# Patient Record
Sex: FEMALE | Race: WHITE | NOT HISPANIC OR LATINO | Employment: FULL TIME | URBAN - METROPOLITAN AREA
[De-identification: names, ages, dates, MRNs, and addresses within clinical notes are randomized per-mention and may not be internally consistent; named-entity substitution may affect disease eponyms.]

---

## 2017-03-27 ENCOUNTER — TELEPHONE (OUTPATIENT)
Dept: FAMILY MEDICINE | Facility: CLINIC | Age: 52
End: 2017-03-27

## 2017-03-27 NOTE — TELEPHONE ENCOUNTER
Pt called because [employer] told her that she needs permission from us to us [facility]. [facility] stated that they have reached out to us twice and have not been able to get hold of anyone and have asked the Pt to call us to see if she could get some where. Told Pt that [facility] does not need our permission that where she goes for pharmacy is determined between her and her insurance.   Pt is going to contact [employer] again.     Sarah.RADHA Larsen ([facility])

## 2017-11-30 ENCOUNTER — OFFICE VISIT (OUTPATIENT)
Dept: FAMILY MEDICINE | Facility: CLINIC | Age: 52
End: 2017-11-30

## 2017-11-30 VITALS
OXYGEN SATURATION: 98 % | HEIGHT: 66 IN | WEIGHT: 153 LBS | BODY MASS INDEX: 24.59 KG/M2 | TEMPERATURE: 98.1 F | SYSTOLIC BLOOD PRESSURE: 114 MMHG | HEART RATE: 82 BPM | DIASTOLIC BLOOD PRESSURE: 76 MMHG

## 2017-11-30 DIAGNOSIS — Z30.8 ENCOUNTER FOR OTHER CONTRACEPTIVE MANAGEMENT: ICD-10-CM

## 2017-11-30 DIAGNOSIS — B00.9 HERPES SIMPLEX DISEASE: ICD-10-CM

## 2017-11-30 DIAGNOSIS — Z23 NEED FOR VACCINATION: ICD-10-CM

## 2017-11-30 DIAGNOSIS — Z00.00 ROUTINE HISTORY AND PHYSICAL EXAMINATION OF ADULT: Primary | ICD-10-CM

## 2017-11-30 LAB
ERYTHROCYTE [DISTWIDTH] IN BLOOD BY AUTOMATED COUNT: 11.3 %
HCT VFR BLD AUTO: 39.4 % (ref 35–47)
HEMOGLOBIN: 13.1 G/DL (ref 11.7–15.7)
MCH RBC QN AUTO: 30.7 PG (ref 26–33)
MCHC RBC AUTO-ENTMCNC: 33.2 G/DL (ref 31–36)
MCV RBC AUTO: 92.2 FL (ref 78–100)
PLATELET COUNT - QUEST: 381 10^9/L (ref 150–375)
RBC # BLD AUTO: 4.27 10*12/L (ref 3.8–5.2)
WBC # BLD AUTO: 8.7 10*9/L (ref 4–11)

## 2017-11-30 PROCEDURE — 90686 IIV4 VACC NO PRSV 0.5 ML IM: CPT | Performed by: FAMILY MEDICINE

## 2017-11-30 PROCEDURE — 90471 IMMUNIZATION ADMIN: CPT | Performed by: FAMILY MEDICINE

## 2017-11-30 PROCEDURE — 85027 COMPLETE CBC AUTOMATED: CPT | Performed by: FAMILY MEDICINE

## 2017-11-30 PROCEDURE — 80048 BASIC METABOLIC PNL TOTAL CA: CPT | Mod: 90 | Performed by: FAMILY MEDICINE

## 2017-11-30 PROCEDURE — 36415 COLL VENOUS BLD VENIPUNCTURE: CPT | Performed by: FAMILY MEDICINE

## 2017-11-30 PROCEDURE — 99396 PREV VISIT EST AGE 40-64: CPT | Mod: 25 | Performed by: FAMILY MEDICINE

## 2017-11-30 PROCEDURE — 80061 LIPID PANEL: CPT | Mod: 90 | Performed by: FAMILY MEDICINE

## 2017-11-30 RX ORDER — ACYCLOVIR 800 MG/1
TABLET ORAL
Qty: 18 TABLET | Refills: 1 | Status: SHIPPED | OUTPATIENT
Start: 2017-11-30 | End: 2018-12-20

## 2017-11-30 RX ORDER — LEVONORGESTREL/ETHIN.ESTRADIOL 0.1-0.02MG
1 TABLET ORAL DAILY
Qty: 84 TABLET | Refills: 1 | Status: SHIPPED | OUTPATIENT
Start: 2017-11-30 | End: 2018-12-20

## 2017-11-30 NOTE — MR AVS SNAPSHOT
"              After Visit Summary   11/30/2017    Thao Boyce    MRN: [id number]           Patient Information     Date Of Birth          1965        Visit Information        Provider Department      11/30/2017 8:00 AM Sara Duran MD [facility]        Today's Diagnoses     Routine history and physical examination of adult    -  1    Need for vaccination        Herpes simplex disease        Encounter for other contraceptive management          Care Instructions    Placebo week (after your third day of the off week)- schedule a lab only appointment          Follow-ups after your visit        Future tests that were ordered for you today     Open Standing Orders        Priority Remaining Interval Expires Ordered    Follicle stimulating hormone ([facility]) Routine 1/1 2/28/2018 11/30/2017            Who to contact     If you have questions or need follow up information about today's clinic visit or your schedule please contact [facility] directly at [phone].  Normal or non-critical lab and imaging results will be communicated to you by MyChart, letter or phone within 4 business days after the clinic has received the results. If you do not hear from us within 7 days, please contact the clinic through [employer] or phone. If you have a critical or abnormal lab result, we will notify you by phone as soon as possible.  Submit refill requests through [employer] or call your pharmacy and they will forward the refill request to us. Please allow 3 business days for your refill to be completed.          Additional Information About Your Visit        MyChart Information     [employer] lets you send messages to your doctor, view your test results, renew your prescriptions, schedule appointments and more. To sign up, go to [email or web address] . Click on \"Log in\" on the left side of the screen, which will take you to the Welcome page. Then click on \"Sign up Now\" on the " "right side of the page.     You will be asked to enter the access code listed below, as well as some personal information. Please follow the directions to create your username and password.     Your access code is: [email or web address]  Expires: 2018  8:38 AM     Your access code will  in 90 days. If you need help or a new code, please call your [facility] clinic or [phone].        Care EveryWhere ID     This is your Care EveryWhere ID. This could be used by other organizations to access your [facility] medical records  [id number]        Your Vitals Were     Pulse Temperature Height Last Period Pulse Oximetry Breastfeeding?    82 98.1  F (36.7  C) (Oral) 1.676 m (5' 6\") 2017 98% No    BMI (Body Mass Index)                   24.69 kg/m2            Blood Pressure from Last 3 Encounters:   17 114/76   16 124/76   10/04/16 120/80    Weight from Last 3 Encounters:   17 69.4 kg (153 lb)   16 72 kg (158 lb 12.8 oz)   10/04/16 69.8 kg (153 lb 12.8 oz)              We Performed the Following     BASIC METABOLIC PANEL ([facility])     HC FLU VAC PRESRV FREE QUAD SPLIT VIR 3+YRS IM     HEMOGRAM/PLATELET (BFP)     Lipid Profile     VACCINE ADMINISTRATION, INITIAL     VENOUS COLLECTION          Where to get your medicines      These medications were sent to [facility]/pharmacy [address] [place], [place] - [address] [facility]  [address] [place] [id number]     Phone:  [phone]     acyclovir 800 MG tablet    levonorgestrel-ethinyl estradiol 0.1-20 MG-MCG per tablet          Primary Care Provider Office Phone # Fax #    Sara Duran [phone] [address] [address]        Equal Access to Services     [facility] : Tarik Sinclair, rylan browne, blanka irby . [facility] [phone].    ATENCIÓN: Si habla español, tiene a nix disposición servicios gratuitos de " asistencia lingüística. Alissa al [phone].    We comply with applicable federal civil rights laws and [place] laws. We do not discriminate on the basis of race, color, national origin, age, disability, sex, sexual orientation, or gender identity.            Thank you!     Thank you for choosing [facility]  for your care. Our goal is always to provide you with excellent care. Hearing back from our patients is one way we can continue to improve our services. Please take a few minutes to complete the written survey that you may receive in the mail after your visit with us. Thank you!             Your Updated Medication List - Protect others around you: Learn how to safely use, store and throw away your medicines at www.disposemymeds.org.          This list is accurate as of: 11/30/17  8:38 AM.  Always use your most recent med list.                   Brand Name Dispense Instructions for use Diagnosis    acyclovir 800 MG tablet    ZOVIRAX    18 tablet    TAKE 1 TABLET BY MOUTH 3 TIMES A DAY FOR 2 DAYS FOR ACUTE SYMPTOMS OF HERPES    Herpes simplex disease       levonorgestrel-ethinyl estradiol 0.1-20 MG-MCG per tablet    AVIANE,ALESSE,LESSINA    84 tablet    Take 1 tablet by mouth daily    Encounter for other contraceptive management       ONE-A-DAY WOMENS FORMULA Tabs      Take 1 tablet by mouth as needed.        selenium sulfide 2.5 % lotion    SELSUN    118 mL    Apply the 2.5% lotion to affected area and lather with small amounts of water; leave on skin for 10 minutes, then rinse thoroughly; apply every day for 7 days;    Dermatitis

## 2017-11-30 NOTE — NURSING NOTE
Thao is here for CPX    Patient is here for a full physical exam.    Pre-Visit Screening :  Immunizations : up to date flu today  Colon Screening : is up to date  Mammogram: UTD  Asthma Action Test/Plan : NA  PHQ9/GAD7 :  None  Fall Risk Assessment NA    Vitals:  Pulse - regular  My Chart - declines    CLASSIFICATION OF OVERWEIGHT AND OBESITY BY BMI                         Obesity Class           BMI(kg/m2)  Underweight                                    < 18.5  Normal                                         18.5-24.9  Overweight                                     25.0-29.9  OBESITY                     I                  30.0-34.9                              II                 35.0-39.9  EXTREME OBESITY             III                >40                             Patient's  BMI Body mass index is 24.69 kg/(m^2).  http://hin.nhlbi.nih.gov/menuplanner/menu.cgi  Questioned patient about current smoking habits.  Pt. has never smoked.    ETOH screening:  Questions:  1-How often do you have a drink containing alcohol?                             2 times per week(s)  2-How many drinks containing alcohol do you have on a typical day when you are         Drinking?                              2   3- How often do you have 5 or more drinks on one occasion?                              Never    Have you ever:  None of the patient's responses to the CAGE screening were positive / Negative CAGE score

## 2017-12-01 LAB
BUN SERPL-MCNC: 14 MG/DL (ref 7–25)
BUN/CREATININE RATIO: NORMAL (CALC) (ref 6–22)
CALCIUM SERPL-MCNC: 9.3 MG/DL (ref 8.6–10.4)
CHLORIDE SERPLBLD-SCNC: 104 MMOL/L (ref 98–110)
CHOLEST SERPL-MCNC: 182 MG/DL
CHOLEST/HDLC SERPL: 3.5 (CALC)
CO2 SERPL-SCNC: 24 MMOL/L (ref 20–31)
CREAT SERPL-MCNC: 0.68 MG/DL (ref 0.5–1.05)
EGFR AFRICAN AMERICAN - QUEST: 117 ML/MIN/1.73M2
GFR SERPL CREATININE-BSD FRML MDRD: 101 ML/MIN/1.73M2
GLUCOSE - QUEST: 86 MG/DL (ref 65–99)
HDLC SERPL-MCNC: 52 MG/DL
LDLC SERPL CALC-MCNC: 105 MG/DL (CALC)
NONHDLC SERPL-MCNC: 130 MG/DL (CALC)
POTASSIUM SERPL-SCNC: 4.1 MMOL/L (ref 3.5–5.3)
SODIUM SERPL-SCNC: 139 MMOL/L (ref 135–146)
TRIGL SERPL-MCNC: 133 MG/DL

## 2018-01-12 ENCOUNTER — TRANSFERRED RECORDS (OUTPATIENT)
Dept: FAMILY MEDICINE | Facility: CLINIC | Age: 53
End: 2018-01-12

## 2018-01-12 DIAGNOSIS — Z00.00 ROUTINE HISTORY AND PHYSICAL EXAMINATION OF ADULT: ICD-10-CM

## 2018-01-12 PROCEDURE — 83001 ASSAY OF GONADOTROPIN (FSH): CPT | Mod: 90 | Performed by: FAMILY MEDICINE

## 2018-01-12 PROCEDURE — 36415 COLL VENOUS BLD VENIPUNCTURE: CPT | Performed by: FAMILY MEDICINE

## 2018-01-13 LAB — FSH SERPL-ACNC: 23.4 MIU/ML

## 2018-12-20 ENCOUNTER — OFFICE VISIT (OUTPATIENT)
Dept: FAMILY MEDICINE | Facility: CLINIC | Age: 53
End: 2018-12-20

## 2018-12-20 VITALS
SYSTOLIC BLOOD PRESSURE: 138 MMHG | WEIGHT: 144 LBS | TEMPERATURE: 97.9 F | HEIGHT: 66 IN | HEART RATE: 69 BPM | BODY MASS INDEX: 23.14 KG/M2 | OXYGEN SATURATION: 98 % | DIASTOLIC BLOOD PRESSURE: 90 MMHG

## 2018-12-20 DIAGNOSIS — B00.9 HERPES SIMPLEX DISEASE: ICD-10-CM

## 2018-12-20 DIAGNOSIS — Z00.00 ENCOUNTER FOR ROUTINE ADULT HEALTH EXAMINATION WITHOUT ABNORMAL FINDINGS: Primary | ICD-10-CM

## 2018-12-20 LAB — GLUCOSE SERPL-MCNC: 92 MG/DL (ref 60–99)

## 2018-12-20 PROCEDURE — 36415 COLL VENOUS BLD VENIPUNCTURE: CPT | Performed by: FAMILY MEDICINE

## 2018-12-20 PROCEDURE — 87389 HIV-1 AG W/HIV-1&-2 AB AG IA: CPT | Mod: 90 | Performed by: FAMILY MEDICINE

## 2018-12-20 PROCEDURE — 82947 ASSAY GLUCOSE BLOOD QUANT: CPT | Performed by: FAMILY MEDICINE

## 2018-12-20 PROCEDURE — 99396 PREV VISIT EST AGE 40-64: CPT | Performed by: FAMILY MEDICINE

## 2018-12-20 RX ORDER — ACYCLOVIR 800 MG/1
TABLET ORAL
Qty: 18 TABLET | Refills: 1 | Status: SHIPPED | OUTPATIENT
Start: 2018-12-20 | End: 2020-01-06

## 2018-12-20 ASSESSMENT — MIFFLIN-ST. JEOR: SCORE: 1279.93

## 2018-12-20 NOTE — PROGRESS NOTES
Chief Complaint: Thao Boyce is an 53 year old woman who presents for preventive health visit.      Besides routine health maintenance, she has no other health concerns today .     Perimenopausal  Period : two this year- May and july  Menopause symptoms:  Might feel wamer  No vaginal dryness  Long term sleep issues  Gabapentin- good relief of hot flashes (not on current list of medications    Health Care Maintenance:discussed  shingrix  Consider Hepatitis A    Cold the past two weeks    Recently learned that her biological father was of [place] ancestery    Healthy Habits:  Do you get at least three servings of calcium containing foods daily (dairy, green leafy vegetables, etc.)?  Lacks fruit and vetatables.  Vitamin: one a day multiple  Outside of work or daily activities, how many days per week do you exercise for 30 minutes or longer? Biking three times a week  Have you had an eye exam in the past two years? yes  Do you see a dentist twice per year? yes    PHQ-2  Over the last two weeks- Have you been bothered by little interest or pleasure in doing things?  No  Over the last two weeks- Have you been feeling down, depressed, or hopeless?  No    Abuse: Current or Past (Physical, Sexual or Emotional)- no  Do you feel safe in your environment - yes    Advanced directive:completed and scanned in [facility]    Social History     Tobacco Use     Smoking status: Never Smoker     Smokeless tobacco: Never Used   Substance Use Topics     Alcohol use: Yes     Alcohol/week: 1.0 oz     Types: 2 drink(s) per week     Comment: 2 oz per week     The patient does not drink >3 drinks per day nor >7 drinks per week.    Reviewed orders with patient.  Reviewed health maintenance and updated orders accordingly - Yes      History of abnormal Pap smear: NO - age 30- 65 PAP every 3 years recommended  NO - age 30-65 PAP every 5 years with negative HPV co-testing recommended  All Histories reviewed and updated in Epic.      ROS:   ROS: 10 point  "ROS neg other than the symptoms noted above in the HPI.  Intentional weight loss of ten+ pounds this year      OBJECTIVE:  /90 (BP Location: Left arm, Patient Position: Sitting, Cuff Size: Adult Regular)   Pulse 69   Temp 97.9  F (36.6  C) (Oral)   Ht 1.676 m (5' 6\")   Wt 65.3 kg (144 lb)   SpO2 98%   BMI 23.24 kg/m    General appearance: Healthy    Skin: Normal. No atypical appearing moles on inspection of trunk and extremities.    External ears  and canals clear bilaterally. TM's normal bilaterally. Nose normal without lesions or discharge. Oropharynx normal. Neck supple without palpable adenopathy.    Breasts are symmetric.  No dominant, discrete, fixed  or suspicious masses are noted.  No skin or nipple changes or axillary nodes.     Regular rate and  rhythm. S1 and S2 normal, no murmurs, clicks, gallops or rubs. No edema or JVD. Chest is clear; no wheezes or rales.    The abdomen is soft without tenderness, guarding, mass or organomegaly. Bowel sounds are normal. No CVA tenderness or inguinal adenopathy noted.    Pelvic:deferred    Rectal exam:deferred  Extremities: negative.    COUNSELING:  Reviewed preventive health counseling, as reflected in patient instructions  Special attention given to:        Regular exercise       Healthy diet/nutrition       Osteoporosis Prevention/Bone Health       Advance Care Planning       Vaccination counseling    ATP III Guidelines  ICSI Preventive Guidelines    ASSESSMENT/PLAN:  (Z00.00) Encounter for routine adult health examination without abnormal findings  (primary encounter diagnosis)  Comment:   Plan: Glucose Fasting (BFP), HIV 1/2 Agn Leanna 4th Gen         w Reflex ([facility]), VENOUS COLLECTION            (B00.9) Herpes simplex disease  Comment:   Plan: acyclovir (ZOVIRAX) 800 MG tablet            "

## 2018-12-20 NOTE — PATIENT INSTRUCTIONS
Consider getting    Hepatitis A  shingrix (shingles vaccine)    Check with insurance    Take meclizine when you have vertigo symptoms for a day or two    Check with insurance about 3D mammogram

## 2018-12-20 NOTE — NURSING NOTE
Patient is here for a full physical exam.    Pre-Visit Screening :  Immunizations : up to date  Colon Screening : is up to date  Mammogram: up to date-patient has been getting done yearly  Asthma Action Test/Plan : No concerns  PHQ9 :  PHQ-2 done today   GAD7 :  No concerns  Patient's  BMI There is no height or weight on file to calculate BMI.  Questioned patient about current smoking habits.  Pt. has never smoked.  OK to leave a detailed voice message regarding today's visit Yes, phone # [phone]      ETOH screening:  Questions:  1-How often do you have a drink containing alcohol?                             1 times per week(s)  2-How many drinks containing alcohol do you have on a typical day when you are         Drinking?                              2-3   3- How often do you have 5 or more drinks on one occasion?                              Couple times a year

## 2018-12-21 LAB — HIV 1/2 AGN ABY 4TH GEN WITH REFLEX: NORMAL

## 2019-01-21 ENCOUNTER — TELEPHONE (OUTPATIENT)
Dept: FAMILY MEDICINE | Facility: CLINIC | Age: 54
End: 2019-01-21

## 2019-01-21 LAB — MAMMOGRAM: NORMAL

## 2019-09-27 ENCOUNTER — HEALTH MAINTENANCE LETTER (OUTPATIENT)
Age: 54
End: 2019-09-27

## 2019-12-27 ENCOUNTER — OFFICE VISIT (OUTPATIENT)
Dept: FAMILY MEDICINE | Facility: CLINIC | Age: 54
End: 2019-12-27

## 2019-12-27 VITALS
SYSTOLIC BLOOD PRESSURE: 126 MMHG | DIASTOLIC BLOOD PRESSURE: 80 MMHG | WEIGHT: 148.2 LBS | OXYGEN SATURATION: 100 % | TEMPERATURE: 98.1 F | HEIGHT: 66 IN | HEART RATE: 67 BPM | BODY MASS INDEX: 23.82 KG/M2

## 2019-12-27 DIAGNOSIS — Z00.00 ROUTINE HISTORY AND PHYSICAL EXAMINATION OF ADULT: Primary | ICD-10-CM

## 2019-12-27 DIAGNOSIS — L30.9 DERMATITIS: ICD-10-CM

## 2019-12-27 LAB
% GRANULOCYTES: 61.1 %
BUN SERPL-MCNC: 13 MG/DL (ref 7–25)
BUN/CREATININE RATIO: 20 (ref 6–22)
CALCIUM SERPL-MCNC: 9.5 MG/DL (ref 8.6–10.3)
CHLORIDE SERPLBLD-SCNC: 104 MMOL/L (ref 98–110)
CHOLEST SERPL-MCNC: 217 MG/DL (ref 0–199)
CHOLEST/HDLC SERPL: 3 {RATIO} (ref 0–5)
CO2 SERPL-SCNC: 29.4 MMOL/L (ref 20–32)
CREAT SERPL-MCNC: 0.65 MG/DL (ref 0.7–1.18)
GLUCOSE SERPL-MCNC: 86 MG/DL (ref 60–99)
HCT VFR BLD AUTO: 40.4 % (ref 35–47)
HDLC SERPL-MCNC: 83 MG/DL (ref 40–150)
HEMOGLOBIN: 13.3 G/DL (ref 11.7–15.7)
LDLC SERPL CALC-MCNC: 113 MG/DL (ref 0–130)
LYMPHOCYTES NFR BLD AUTO: 30.3 %
MCH RBC QN AUTO: 30 PG (ref 26–33)
MCHC RBC AUTO-ENTMCNC: 32.9 G/DL (ref 31–36)
MCV RBC AUTO: 91.1 FL (ref 78–100)
MONOCYTES NFR BLD AUTO: 8.6 %
PLATELET COUNT - QUEST: 319 10^9/L (ref 150–375)
POTASSIUM SERPL-SCNC: 4.16 MMOL/L (ref 3.5–5.3)
RBC # BLD AUTO: 4.44 10*12/L (ref 3.8–5.2)
SODIUM SERPL-SCNC: 142.2 MMOL/L (ref 135–146)
TRIGL SERPL-MCNC: 105 MG/DL (ref 0–149)
WBC # BLD AUTO: 6.2 10*9/L (ref 4–11)

## 2019-12-27 PROCEDURE — 36415 COLL VENOUS BLD VENIPUNCTURE: CPT | Performed by: FAMILY MEDICINE

## 2019-12-27 PROCEDURE — 99396 PREV VISIT EST AGE 40-64: CPT | Performed by: FAMILY MEDICINE

## 2019-12-27 PROCEDURE — 80061 LIPID PANEL: CPT | Performed by: FAMILY MEDICINE

## 2019-12-27 PROCEDURE — 80048 BASIC METABOLIC PNL TOTAL CA: CPT | Performed by: FAMILY MEDICINE

## 2019-12-27 PROCEDURE — 85025 COMPLETE CBC W/AUTO DIFF WBC: CPT | Performed by: FAMILY MEDICINE

## 2019-12-27 RX ORDER — SELENIUM SULFIDE 2.5 MG/100ML
LOTION TOPICAL
Qty: 118 ML | Status: SHIPPED | OUTPATIENT
Start: 2019-12-27 | End: 2021-06-21

## 2019-12-27 SDOH — HEALTH STABILITY: MENTAL HEALTH: HOW OFTEN DO YOU HAVE A DRINK CONTAINING ALCOHOL?: 2-3 TIMES A WEEK

## 2019-12-27 SDOH — HEALTH STABILITY: MENTAL HEALTH: HOW MANY STANDARD DRINKS CONTAINING ALCOHOL DO YOU HAVE ON A TYPICAL DAY?: 1 OR 2

## 2019-12-27 ASSESSMENT — MIFFLIN-ST. JEOR: SCORE: 1293.98

## 2019-12-27 NOTE — NURSING NOTE
Thao is here for CPX with pap today    Pre-visit Screening:  Immunizations:  up to date  Colonoscopy:  is up to date  Mammogram: is up to date  Asthma Action Test/Plan:  NA  PHQ9:  None  GAD7:  None  Questioned patient about current smoking habits Pt. has never smoked.  Ok to leave detailed message on voice mail for today's visit only Yes, phone # [phone]    Hearing screen:  PASS

## 2019-12-27 NOTE — PROGRESS NOTES
Chief Complaint: Thao Boyce is an 54 year old woman who presents for preventive health visit.     Health Care Maintenance   Shingrix vaccine  Pap cotesting with HPV- negative three years ago- up to date     Besides routine health maintenance, she has no other health concerns today .   Mammogram due in January    Menstrual history:2018- two periods, March and August  No bothersome menopause symptoms  Family history of osteoporosis: none  Paternal history unknown-   Mother- hypertension, alive and independent age 80    Healthy Habits:  Do you get at least three servings of calcium containing foods daily (dairy, green leafy vegetables, etc.)? yes  Takes multivitamin  Outside of work or daily activities, how many days per week do you exercise for 30 minutes or longer? StationContentful bike three times a week for 30 minutes  Have you had an eye exam in the past two years? yes  Do you see a dentist twice per year? yes    PHQ-2  Over the last two weeks- Have you been bothered by little interest or pleasure in doing things?  no  Over the last two weeks- Have you been feeling down, depressed, or hopeless?  No        Abuse: Current or Past (Physical, Sexual or Emotional)- no  Do you feel safe in your environment - yes    Advanced directive : completed    Social History     Tobacco Use     Smoking status: Never Smoker     Smokeless tobacco: Never Used   Substance Use Topics     Alcohol use: Yes     Alcohol/week: 1.7 standard drinks     Types: 2 drink(s) per week     Comment: 2 oz per week     The patient does not drink >3 drinks per day nor >7 drinks per week.    Reviewed orders with patient.  Reviewed health maintenance and updated orders accordingly - Yes      History of abnormal Pap smear: NO - age 30-65 PAP every 5 years with negative HPV co-testing recommended  All Histories reviewed and updated in [facility].      ROS:  CONSTITUTIONAL: NEGATIVE for fever, chills, change in weight  INTEGUMENTARY/SKIN: NEGATIVE for worrisome  "rashes, moles or lesions  EYES: NEGATIVE for vision changes or irritation  ENT: NEGATIVE for ear, mouth and throat problems  RESP: NEGATIVE for significant cough or SOB  BREAST: NEGATIVE for masses, tenderness or discharge  CV: NEGATIVE for chest pain, palpitations or peripheral edema  GI: NEGATIVE for nausea, abdominal pain, heartburn, or change in bowel habits  : NEGATIVE for unusual urinary or vaginal symptoms. No vaginal bleeding.  MUSCULOSKELETAL: NEGATIVE for significant arthralgias or myalgia  NEURO: NEGATIVE for weakness, dizziness or paresthesias  PSYCHIATRIC: NEGATIVE for changes in mood or affect     Screening at work:  systolic      OBJECTIVE:  /80 (BP Location: Right arm, Patient Position: Sitting, Cuff Size: Adult Regular)   Pulse 67   Temp 98.1  F (36.7  C) (Oral)   Ht 1.676 m (5' 6\")   Wt 67.2 kg (148 lb 3.2 oz)   SpO2 100%   BMI 23.92 kg/m    General appearance: Healthy    Skin: few brown patches upper back- similar to when she was treated for tinea versicolor.  No atypical appearing moles on inspection of trunk and extremities.    External ears  and canals clear bilaterally. TM's normal bilaterally. Nose normal without lesions or discharge. Oropharynx normal. Neck supple without palpable adenopathy.    Breasts are symmetric.  No dominant, discrete, fixed  or suspicious masses are noted.  No skin or nipple changes or axillary nodes. Self exam is taught and encouraged.    Regular rate and  rhythm. S1 and S2 normal, no murmurs, clicks, gallops or rubs. No edema or JVD. Chest is clear; no wheezes or rales.    The abdomen is soft without tenderness, guarding, mass or organomegaly. Bowel sounds are normal. No CVA tenderness or inguinal adenopathy noted.    Pelvic:  deferred    Rectal exam: deferred  Extremities: negative.      COUNSELING:  Reviewed preventive health counseling, as reflected in patient instructions  Special attention given to:        Regular exercise       Healthy " diet/nutrition       Osteoporosis Prevention/Bone Health       ([place]ika)menopause management    ATP III Guidelines  ICSI Preventive Guidelines    ASSESSMENT/PLAN:  (Z00.00) Routine history and physical examination of adult  (primary encounter diagnosis)  Comment:   Plan: Basic Metabolic Panel (BFP), Lipid Panel (BFP),        HEMOGRAM/PLATE/DIFF, VENOUS COLLECTION            (L30.9) Dermatitis  Comment: upper back dark patches- history of tinea  Plan: selenium sulfide (SELSUN) 2.5 % external lotion

## 2020-01-16 ENCOUNTER — TELEPHONE (OUTPATIENT)
Dept: FAMILY MEDICINE | Facility: CLINIC | Age: 55
End: 2020-01-16

## 2020-01-16 DIAGNOSIS — M25.551 HIP PAIN, RIGHT: Primary | ICD-10-CM

## 2020-01-16 NOTE — TELEPHONE ENCOUNTER
Thao left a voicemail stating that her hip pain that she mentioned at her CPX in December is still causing her trouble and it is painful while walking. Dr. Duran had mentioned it was a ligament issue? She wants to know what to do next/reccommendations? To come in or a referral?        Please advise, thanks.      Her phone # [phone]

## 2020-01-16 NOTE — TELEPHONE ENCOUNTER
Thao would like a referral to [facility]. She states the right hip pain has been sporadic for months but recently for the past week its more constant. Thanks.

## 2020-01-16 NOTE — TELEPHONE ENCOUNTER
Not adequately addressed at wellness visit- office visit or referral to [facility] recommended  Please call

## 2020-01-20 ENCOUNTER — MYC MEDICAL ADVICE (OUTPATIENT)
Dept: FAMILY MEDICINE | Facility: CLINIC | Age: 55
End: 2020-01-20

## 2020-01-20 NOTE — TELEPHONE ENCOUNTER
Orthopedic referral was sent to [facility], they will call patient to make her appt.  I will also send patient a MY CHART message with info for [facility]  [address]   [address]  [facility] [address]  [phone] -- appt line  [phone] -- fax

## 2020-01-30 ENCOUNTER — TRANSFERRED RECORDS (OUTPATIENT)
Dept: FAMILY MEDICINE | Facility: CLINIC | Age: 55
End: 2020-01-30

## 2020-03-15 ENCOUNTER — HEALTH MAINTENANCE LETTER (OUTPATIENT)
Age: 55
End: 2020-03-15

## 2020-06-05 ENCOUNTER — ALLIED HEALTH/NURSE VISIT (OUTPATIENT)
Dept: FAMILY MEDICINE | Facility: CLINIC | Age: 55
End: 2020-06-05

## 2020-06-05 DIAGNOSIS — Z23 NEED FOR VACCINATION: Primary | ICD-10-CM

## 2020-06-05 PROCEDURE — 90750 HZV VACC RECOMBINANT IM: CPT | Performed by: PHYSICIAN ASSISTANT

## 2020-06-05 PROCEDURE — 90471 IMMUNIZATION ADMIN: CPT | Performed by: PHYSICIAN ASSISTANT

## 2020-07-28 ENCOUNTER — OFFICE VISIT (OUTPATIENT)
Dept: FAMILY MEDICINE | Facility: CLINIC | Age: 55
End: 2020-07-28

## 2020-07-28 VITALS
SYSTOLIC BLOOD PRESSURE: 130 MMHG | BODY MASS INDEX: 24.33 KG/M2 | WEIGHT: 151.4 LBS | OXYGEN SATURATION: 99 % | TEMPERATURE: 98.6 F | HEIGHT: 66 IN | HEART RATE: 71 BPM | DIASTOLIC BLOOD PRESSURE: 80 MMHG

## 2020-07-28 DIAGNOSIS — R42 RECURRENT VERTIGO: ICD-10-CM

## 2020-07-28 DIAGNOSIS — Z01.419 ENCOUNTER FOR GYNECOLOGICAL EXAMINATION: Primary | ICD-10-CM

## 2020-07-28 DIAGNOSIS — Z12.83 SCREENING FOR SKIN CANCER: ICD-10-CM

## 2020-07-28 DIAGNOSIS — R73.09 LOW GLUCOSE LEVEL: ICD-10-CM

## 2020-07-28 DIAGNOSIS — Z12.4 ENCOUNTER FOR SCREENING FOR CERVICAL CANCER: ICD-10-CM

## 2020-07-28 PROCEDURE — 90471 IMMUNIZATION ADMIN: CPT | Performed by: PHYSICIAN ASSISTANT

## 2020-07-28 PROCEDURE — 90714 TD VACC NO PRESV 7 YRS+ IM: CPT | Performed by: PHYSICIAN ASSISTANT

## 2020-07-28 PROCEDURE — 99396 PREV VISIT EST AGE 40-64: CPT | Mod: 25 | Performed by: PHYSICIAN ASSISTANT

## 2020-07-28 SDOH — HEALTH STABILITY: MENTAL HEALTH: HOW OFTEN DO YOU HAVE 6 OR MORE DRINKS ON ONE OCCASION?: NEVER

## 2020-07-28 ASSESSMENT — MIFFLIN-ST. JEOR: SCORE: 1299.53

## 2020-07-28 NOTE — PROGRESS NOTES
Chief Complaint:  Physical Exam    SUBJECTIVE:   Thao Boyce is a 55 year old female presents for routine health maintenance.    Current concerns: Update pap.    Had biometric screening 2 months ago and sugar level was 57- down from typically in 80s. Would like to recheck this at some point.    Has not had 3 episodes of vertigo, and would like this documented.     Would like to be referred to dermatology to establish for skin checks.     Menses are absent    Patient's last menstrual period was 2017.     Was last Pap smear normal: Yes  Due for mammogram:  No    Body mass index is 24.62 kg/m .    Present exercise habits:  1 times/week. Needs to get back now that right hip is better.   Present dietary habits:  eats regular meals and follows a balanced nutrition diet    Calcium intake: 2 servings plus supplement  Vit D intake: is taking supplement    Is the patient a smoker? No  If yes, smoking cessation advised and counseling provided.     Cardiovascular risk factors: none    Over the past few weeks, have you felt down or depressed? Little interest or pleasure in doing things? No concerns    If in a relationship are there any Domestic violence concern: No    Last dental appointment:  this year  Last optical appointment:  last year    Was the patient born between [birth year] and has not had Hep C testing?  No, not applicable    I have reviewed the following histories: Past Medical History, Past Surgical History, Social History, Family History, Problem List, Medication List and Allergies    Past Medical History:   Diagnosis Date     ATYP SQ CELL CHNG UNDET SIG FAV DYS 2004     Family History   Problem Relation Age of Onset     Alzheimer Disease Maternal Grandmother              Arthritis Maternal Grandfather              Heart Disease Maternal Grandfather         pacemaker     Arthritis Mother         hip replacements/osteo; hip replacement     Hypertension Mother      Social History      Socioeconomic History     Marital status: Single     Spouse name: Not on file     Number of children: Not on file     Years of education: Not on file     Highest education level: Not on file   Occupational History     Not on file   Social Needs     Financial resource strain: Not on file     Food insecurity     Worry: Not on file     Inability: Not on file     Transportation needs     Medical: Not on file     Non-medical: Not on file   Tobacco Use     Smoking status: Never Smoker     Smokeless tobacco: Never Used   Substance and Sexual Activity     Alcohol use: Yes     Alcohol/week: 1.7 standard drinks     Types: 2 Standard drinks or equivalent per week     Frequency: 2-3 times a week     Drinks per session: 1 or 2     Binge frequency: Never     Comment: 2 oz per week     Drug use: No     Sexual activity: Yes     Partners: Male     Birth control/protection: Pill   Lifestyle     Physical activity     Days per week: Not on file     Minutes per session: Not on file     Stress: Not on file   Relationships     Social connections     Talks on phone: Not on file     Gets together: Not on file     Attends [employer] service: Not on file     Active member of club or organization: Not on file     Attends meetings of clubs or organizations: Not on file     Relationship status: Not on file     Intimate partner violence     Fear of current or ex partner: Not on file     Emotionally abused: Not on file     Physically abused: Not on file     Forced sexual activity: Not on file   Other Topics Concern     Not on file   Social History Narrative     Not on file     Past Surgical History:   Procedure Laterality Date     C ANESTH,DX ARTHROSCOPIC PROC KNEE JOINT  1986    R knee     C X-RAY TEETH SINGLE  1982    wisdom pulled     COLONOSCOPY  7/13/2012    ischemic colitis; repeat in 10 years     HC COLP CERVIX/UPPER VAGINA W LOOP ELEC BX CERVIX  6/2003       ROS:  E/M: NEGATIVE for ear, nose, mouth and throat problems  R: NEGATIVE for  "significant/chronic cough or SOB  CV: NEGATIVE for chest pain or palpitations  GI: NEGATIVE for abdominal pain, chronic diarrhea or constipation  :  NEGATIVE for dysuria, hematuria or vaginal discharge. No sexual health concerns.       Current Outpatient Medications   Medication     acyclovir (ZOVIRAX) 800 MG tablet     Multiple Vitamins-Calcium (ONE-A-DAY WOMENS FORMULA) TABS     selenium sulfide (SELSUN) 2.5 % external lotion     No current facility-administered medications for this visit.        Patient Active Problem List    Diagnosis Date Noted     Recurrent vertigo 07/28/2020     Priority: Medium     10/2016,        HSV (herpes simplex virus) infection 11/16/2015     Priority: Medium     Tinea versicolor 09/15/2014     Priority: Medium     Health Care Home 09/13/2013     Priority: Medium     State Tier Level:  Tier 1  Status:  n/a  Care Coordinator:   See Letters for [facility] Care Plan           ACP (advance care planning) 07/27/2012     Priority: Medium     Advance Care Planning 11/16/2015: ACP Review and Resources Provided:  Reviewed chart for advance care plan.  Thao ROBERTO Boyce has no plan or code status on file. Discussed available resources and provided with information. Confirmed code status reflects current choices pending further ACP discussions.  Confirmed/documented legally designated decision maker(s). Added by Camelia Jarrell               Allergic rhinitis 11/25/2003     Priority: Medium     Problem list name updated by automated process. Provider to review           OBJECTIVE:  /80 (BP Location: Left arm, Patient Position: Sitting, Cuff Size: Adult Regular)   Pulse 71   Temp 98.6  F (37  C) (Oral)   Ht 1.67 m (5' 5.75\")   Wt 68.7 kg (151 lb 6.4 oz)   LMP 11/16/2017   SpO2 99%   BMI 24.62 kg/m      General: 55 year old female who appears her stated age. Vital signs noted.  Head: Normocephalic  Eyes: pupils equal round reactive to light and accomodation, extra ocular movements " "intact  Ears: external canals and TMs free of abnormalities  Nose: patent, without mucosal abnormalities  Mouth and throat: without erythema or lesions of the mucosa  Neck: supple, without adenopathy or thyromegaly  Lungs: clear to auscultation, no wheezing or crackles  Breasts: skin without rash, no dominant mass, no nipple discharge, or axillary adenopathy  Cv: regular rate and rhythm, normal s1 and s2 without murmur or click  Abd: soft, non-tender, no masses, no hepatomegaly or splenomegaly.   (female): normal female external genitalia, normal urethral meatus, vaginal mucosa, normal cervix/adnexa/uterus without masses or discharge  Ms: normal muscle tone & symmetry  Skin: clear to inspection and with no palpable abnormalities.  Neuro: sensation and motor function grossly intact; cranial nerves without obvious abnormalities.    ASSESSMENT/PLAN:    1. Encounter for gynecological examination  Thao is doing well today. Will update pap today, and send MyChart with results when available. No indication for labs today, as she is not fasting, but will order standing lab for fasting glucose, to see if still significantly low. Fortunately, at the time where her glucose was in the 50s, Thao denies any concerning symptoms.     2. Encounter for screening for cervical cancer   - ThinPrep Pap and HPV (mRNA E6/E7)HPV-REFLEX ([facility])    3. Low glucose level  - VENOUS COLLECTION; Standing  - Glucose Fasting (BFP); Standing    4. Recurrent vertigo    5. Screening for skin cancer  - DERMATOLOGY REFERRAL     reports that she has never smoked. She has never used smokeless tobacco.      Estimated body mass index is 24.62 kg/m  as calculated from the following:    Height as of this encounter: 1.67 m (5' 5.75\").    Weight as of this encounter: 68.7 kg (151 lb 6.4 oz).        Labs pending:      Fasting glucose  Meds Suggested:      Vitamin D       Calcium  Tests Recommended:      Regular Dental Examinations        Eye exam  Behavior " Modifications:       Cardiovascular exercise 3 times per week--enough to get your Target Heart rate  Other recommendations:     BMI noted and discussed      Regular breast exam     Encouraged My Chart    Counseling Resources:  ATP IV Guidelines  Pooled Cohorts Equation Calculator  Breast Cancer Risk Calculator  FRAX Risk Assessment  ICSI Preventive Guidelines  Dietary Guidelines for Americans, 2010  [employer]'s MyPlate            Stacia Ramsay PA-C  7/28/2020

## 2020-07-31 LAB
CLINICAL HISTORY - QUEST: NORMAL
COMMENT - QUEST: NORMAL
CYTOTECHNOLOGIST - QUEST: NORMAL
DESCRIPTIVE DIAGNOSIS - QUEST: NORMAL
LAST PAP DX - QUEST: NORMAL
LMP - QUEST: NORMAL
PREV BX DX - QUEST: NORMAL
REVIEW CYTOTECHNOLOGIST - QUEST: NORMAL
SOURCE: NORMAL
STATEMENT OF ADEQUACY - QUEST: NORMAL

## 2020-08-04 DIAGNOSIS — R73.09 LOW GLUCOSE LEVEL: ICD-10-CM

## 2020-08-04 LAB — GLUCOSE SERPL-MCNC: NORMAL MG/DL (ref 60–99)

## 2020-08-04 PROCEDURE — 82947 ASSAY GLUCOSE BLOOD QUANT: CPT | Performed by: PHYSICIAN ASSISTANT

## 2020-08-04 PROCEDURE — 36415 COLL VENOUS BLD VENIPUNCTURE: CPT | Performed by: PHYSICIAN ASSISTANT

## 2020-10-28 ENCOUNTER — TRANSFERRED RECORDS (OUTPATIENT)
Dept: FAMILY MEDICINE | Facility: CLINIC | Age: 55
End: 2020-10-28

## 2021-03-06 ENCOUNTER — MYC MEDICAL ADVICE (OUTPATIENT)
Dept: FAMILY MEDICINE | Facility: CLINIC | Age: 56
End: 2021-03-06

## 2021-04-02 ENCOUNTER — TRANSFERRED RECORDS (OUTPATIENT)
Dept: FAMILY MEDICINE | Facility: CLINIC | Age: 56
End: 2021-04-02

## 2021-06-01 DIAGNOSIS — B00.9 HERPES SIMPLEX DISEASE: ICD-10-CM

## 2021-06-01 RX ORDER — ACYCLOVIR 800 MG/1
800 TABLET ORAL 3 TIMES DAILY
Qty: 18 TABLET | Refills: 3 | Status: SHIPPED | OUTPATIENT
Start: 2021-06-01 | End: 2022-07-01

## 2021-06-01 NOTE — TELEPHONE ENCOUNTER
Thao Boyce is requesting a refill of:    Pending Prescriptions:                       Disp   Refills    acyclovir (ZOVIRAX) 800 MG tablet         18 tab*0            Sig: Take 1 tablet (800 mg) by mouth 3 times daily For           2 days    Please close encounter if RX was sent. Thanks, Ruba

## 2021-06-21 ENCOUNTER — OFFICE VISIT (OUTPATIENT)
Dept: FAMILY MEDICINE | Facility: CLINIC | Age: 56
End: 2021-06-21

## 2021-06-21 VITALS
TEMPERATURE: 98 F | HEART RATE: 71 BPM | OXYGEN SATURATION: 99 % | HEIGHT: 66 IN | BODY MASS INDEX: 24.17 KG/M2 | SYSTOLIC BLOOD PRESSURE: 120 MMHG | WEIGHT: 150.4 LBS | DIASTOLIC BLOOD PRESSURE: 80 MMHG

## 2021-06-21 DIAGNOSIS — E78.00 PURE HYPERCHOLESTEROLEMIA: ICD-10-CM

## 2021-06-21 DIAGNOSIS — Z00.00 ROUTINE GENERAL MEDICAL EXAMINATION AT A HEALTH CARE FACILITY: Primary | ICD-10-CM

## 2021-06-21 DIAGNOSIS — N64.4 BREAST PAIN, LEFT: ICD-10-CM

## 2021-06-21 DIAGNOSIS — J30.1 SEASONAL ALLERGIC RHINITIS DUE TO POLLEN: ICD-10-CM

## 2021-06-21 PROBLEM — R42 RECURRENT VERTIGO: Status: RESOLVED | Noted: 2020-07-28 | Resolved: 2021-06-21

## 2021-06-21 LAB
CHOLEST SERPL-MCNC: 182 MG/DL (ref 0–199)
CHOLEST/HDLC SERPL: 2 {RATIO} (ref 0–5)
GLUCOSE SERPL-MCNC: 88 MG/DL (ref 60–99)
HDLC SERPL-MCNC: 74 MG/DL (ref 40–150)
LDLC SERPL CALC-MCNC: 93 MG/DL (ref 0–130)
TRIGL SERPL-MCNC: 77 MG/DL (ref 0–149)

## 2021-06-21 PROCEDURE — 82947 ASSAY GLUCOSE BLOOD QUANT: CPT | Performed by: PHYSICIAN ASSISTANT

## 2021-06-21 PROCEDURE — 80061 LIPID PANEL: CPT | Performed by: PHYSICIAN ASSISTANT

## 2021-06-21 PROCEDURE — 36415 COLL VENOUS BLD VENIPUNCTURE: CPT | Performed by: PHYSICIAN ASSISTANT

## 2021-06-21 PROCEDURE — 99396 PREV VISIT EST AGE 40-64: CPT | Performed by: PHYSICIAN ASSISTANT

## 2021-06-21 ASSESSMENT — MIFFLIN-ST. JEOR: SCORE: 1286.02

## 2021-06-21 NOTE — PROGRESS NOTES
SUBJECTIVE:   CC: Thao Boyce is an 56 year old woman who presents for preventive health visit.       Patient has been advised of split billing requirements and indicates understanding: Yes     Healthy Habits:    Do you get at least three servings of calcium containing foods daily (dairy, green leafy vegetables, etc.)? yes    Amount of exercise or daily activities, outside of work: stationary bike    Problems taking medications regularly No    Medication side effects: No    Have you had an eye exam in the past two years? yes    Do you see a dentist twice per year? yes        Herpes - genital   Tingling  Acyclovir - 2 outbreaks per year        Allergies  - hayfever in the fall  Claritin  ithcy eyes, stuffiness    Left breast pain for 3 days  Resolved.         Today's PHQ-2 Score:   PHQ-2 ( 1999 Pfizer) 6/21/2021 12/27/2019   Q1: Little interest or pleasure in doing things 0 0   Q2: Feeling down, depressed or hopeless 0 0   PHQ-2 Score 0 0       Abuse: Current or Past(Physical, Sexual or Emotional)- No  Do you feel safe in your environment? Yes    Have you ever done Advance Care Planning? (For example, a Health Directive, POLST, or a discussion with a medical provider or your loved ones about your wishes): Yes, advance care planning is on file.    Social History     Tobacco Use     Smoking status: Never Smoker     Smokeless tobacco: Never Used   Substance Use Topics     Alcohol use: Yes     Alcohol/week: 1.7 standard drinks     Types: 2 Standard drinks or equivalent per week     Frequency: 2-3 times a week     Drinks per session: 1 or 2     Binge frequency: Never     Comment: 2 oz per week     If you drink alcohol do you typically have >3 drinks per day or >7 drinks per week? No                     Reviewed orders with patient.  Reviewed health maintenance and updated orders accordingly - Yes  Lab work is in process  Labs reviewed in EPIC  BP Readings from Last 3 Encounters:   06/21/21 120/80   07/28/20 130/80    12/27/19 126/80    Wt Readings from Last 3 Encounters:   06/21/21 68.2 kg (150 lb 6.4 oz)   07/28/20 68.7 kg (151 lb 6.4 oz)   12/27/19 67.2 kg (148 lb 3.2 oz)                  Patient Active Problem List   Diagnosis     ACP (advance care planning)     Health Care Home     HSV (herpes simplex virus) infection -genital     Pure hypercholesterolemia     Past Surgical History:   Procedure Laterality Date     C ANESTH,DX ARTHROSCOPIC PROC KNEE JOINT  01/01/1986    R knee - cartilage     C X-RAY TEETH SINGLE  01/01/1982    wisdom pulled     COLONOSCOPY  07/13/2012    ischemic colitis; repeat in 10 years     HC COLP CERVIX/UPPER VAGINA W LOOP ELEC BX CERVIX  06/01/2003       Social History     Tobacco Use     Smoking status: Never Smoker     Smokeless tobacco: Never Used   Substance Use Topics     Alcohol use: Yes     Alcohol/week: 1.7 standard drinks     Types: 2 Standard drinks or equivalent per week     Frequency: 2-3 times a week     Drinks per session: 1 or 2     Binge frequency: Never     Comment: 2 oz per week     Family History   Problem Relation Age of Onset     Alzheimer Disease Maternal Grandmother      Arthritis Maternal Grandfather      Heart Disease Maternal Grandfather         pacemaker     Arthritis Mother         hip replacements/osteo; hip replacement     Hypertension Mother      Other - See Comments Mother         kidney         Current Outpatient Medications   Medication Sig Dispense Refill     acyclovir (ZOVIRAX) 800 MG tablet Take 1 tablet (800 mg) by mouth 3 times daily For 2 days 18 tablet 3     Multiple Vitamins-Calcium (ONE-A-DAY WOMENS FORMULA) TABS Take 1 tablet by mouth as needed.       Allergies   Allergen Reactions     Sulfa Drugs Rash     rash     Recent Labs   Lab Test 12/27/19 11/30/17  0854 12/30/16  1001 11/16/15  0923    105* 134* 108   HDL 83 52 55 45*   TRIG 105 133 142 129   CR 0.65 0.68  --  0.63   GFRESTIMATED  --  101  --  105   POTASSIUM 4.16 4.1  --  3.9        FSH-7:  No flowsheet data found.  click delete button to remove this line now  Mammogram Screening: Recommended mammography every 1-2 years with patient discussion and risk factor consideration  Pertinent mammograms are reviewed under the imaging tab.    Pertinent mammograms are reviewed under the imaging tab.  History of abnormal Pap smear: YES - updated in Problem List and Health Maintenance accordingly  PAP / HPV Latest Ref Rng & Units 12/30/2016 7/21/2010 6/2/2008   PAP DATE - [facility] - - - -   HPV DNA - - NOT DETECTED DETECTED(A)   HPV DNA INT/HIGH RISK NOTDETECTED - - -   HPV MRNA E6/E7 Not Detected Not Detected - -     Reviewed and updated as needed this visit by clinical staff  Tobacco  Allergies  Meds  Problems  Med Hx  Surg Hx  Fam Hx          Reviewed and updated as needed this visit by Provider                Past Medical History:   Diagnosis Date     ATYP SQ CELL CHNG UNDET SIG FAV DYS 1/5/2004     Recurrent vertigo 7/28/2020    10/2016,      Tinea versicolor 9/15/2014      Past Surgical History:   Procedure Laterality Date     C ANESTH,DX ARTHROSCOPIC PROC KNEE JOINT  01/01/1986    R knee - cartilage     C X-RAY TEETH SINGLE  01/01/1982    wisdom pulled     COLONOSCOPY  07/13/2012    ischemic colitis; repeat in 10 years     HC COLP CERVIX/UPPER VAGINA W LOOP ELEC BX CERVIX  06/01/2003       ROS:  CONSTITUTIONAL: NEGATIVE for fever, chills, change in weight  INTEGUMENTARY/SKIN: NEGATIVE for worrisome rashes, moles or lesions  EYES: NEGATIVE for vision changes or irritation  ENT: NEGATIVE for ear, mouth and throat problems  RESP: NEGATIVE for significant cough or SOB  BREAST: Left breast pain 3 days ago - was doing yard work, resolved  No masses  CV: NEGATIVE for chest pain, palpitations or peripheral edema  GI: NEGATIVE for nausea, abdominal pain, heartburn, or change in bowel habits  : NEGATIVE for unusual urinary or vaginal symptoms. No vaginal bleeding.  MUSCULOSKELETAL: NEGATIVE for significant  "arthralgias or myalgia  NEURO: NEGATIVE for weakness, dizziness or paresthesias  PSYCHIATRIC: NEGATIVE for changes in mood or affect     OBJECTIVE:   /80 (BP Location: Right arm, Patient Position: Sitting, Cuff Size: Adult Large)   Pulse 71   Temp 98  F (36.7  C) (Oral)   Ht 1.664 m (5' 5.5\")   Wt 68.2 kg (150 lb 6.4 oz)   LMP 11/16/2017   SpO2 99%   BMI 24.65 kg/m    EXAM:  GENERAL: healthy, alert and no distress  EYES: Eyes grossly normal to inspection, PERRL and conjunctivae and sclerae normal  HENT: ear canals and TM's normal, nose and mouth without ulcers or lesions  NECK: no adenopathy, no asymmetry, masses, or scars and thyroid normal to palpation  RESP: lungs clear to auscultation - no rales, rhonchi or wheezes  BREAST: normal without masses, tenderness or nipple discharge and no palpable axillary masses or adenopathy  CV: regular rate and rhythm, normal S1 S2, no S3 or S4, no murmur, click or rub, no peripheral edema and peripheral pulses strong  ABDOMEN: soft, nontender, no hepatosplenomegaly, no masses and bowel sounds normal  MS: no gross musculoskeletal defects noted, no edema  SKIN: no suspicious lesions or rashes  NEURO: Normal strength and tone, mentation intact and speech normal  PSYCH: mentation appears normal, affect normal/bright    Diagnostic Test Results:  Labs reviewed in Epic    ASSESSMENT/PLAN:   1. Routine general medical examination at a health care facility  - VENOUS COLLECTION  - Glucose Fasting (BFP)  - Lipid Panel (BFP)    2. Pure hypercholesterolemia    - VENOUS COLLECTION  - Lipid Panel (BFP)    3. Breast pain, left  Resolved  Call if returns, will order dg mammogram    4. Seasonal allergic rhinitis        Patient has been advised of split billing requirements and indicates understanding: Yes  COUNSELING:   Special attention given to:        Regular exercise       Healthy diet/nutrition    Estimated body mass index is 24.65 kg/m  as calculated from the following:    " "Height as of this encounter: 1.664 m (5' 5.5\").    Weight as of this encounter: 68.2 kg (150 lb 6.4 oz).          She reports that she has never smoked. She has never used smokeless tobacco.      Counseling Resources:  ATP IV Guidelines  Pooled Cohorts Equation Calculator  Breast Cancer Risk Calculator  BRCA-Related Cancer Risk Assessment: FHS-7 Tool  FRAX Risk Assessment  ICSI Preventive Guidelines  Dietary Guidelines for Americans, 2010  USDA's MyPlate  ASA Prophylaxis  Lung CA Screening    BABAR Tom  [facility]  "

## 2021-06-21 NOTE — NURSING NOTE
Thao is here for fasting CPX.    Pre-Visit Screening:  Immunizations:UTD  Colonoscopy:UTD  Mammogram:UTD  Asthma Action Test/Plan:NA  PHQ9:NA  GAD7:Na  Questioned patient about current smoking habits Pt.never  OK to leave a detailed message on voice mail for today's visit yes, phone # [phone]  ACP dicussed  Hearing done

## 2021-10-23 ENCOUNTER — HEALTH MAINTENANCE LETTER (OUTPATIENT)
Age: 56
End: 2021-10-23

## 2021-12-13 ENCOUNTER — MYC MEDICAL ADVICE (OUTPATIENT)
Dept: FAMILY MEDICINE | Facility: CLINIC | Age: 56
End: 2021-12-13

## 2022-05-05 ENCOUNTER — TRANSFERRED RECORDS (OUTPATIENT)
Dept: FAMILY MEDICINE | Facility: CLINIC | Age: 57
End: 2022-05-05

## 2022-05-05 LAB — MAMMOGRAM: NORMAL

## 2022-07-01 ENCOUNTER — OFFICE VISIT (OUTPATIENT)
Dept: FAMILY MEDICINE | Facility: CLINIC | Age: 57
End: 2022-07-01

## 2022-07-01 VITALS
DIASTOLIC BLOOD PRESSURE: 64 MMHG | SYSTOLIC BLOOD PRESSURE: 104 MMHG | BODY MASS INDEX: 24.71 KG/M2 | RESPIRATION RATE: 20 BRPM | WEIGHT: 157.4 LBS | HEIGHT: 67 IN | HEART RATE: 76 BPM

## 2022-07-01 DIAGNOSIS — E78.00 PURE HYPERCHOLESTEROLEMIA: ICD-10-CM

## 2022-07-01 DIAGNOSIS — B00.9 HERPES SIMPLEX DISEASE: ICD-10-CM

## 2022-07-01 DIAGNOSIS — J30.89 SEASONAL ALLERGIC RHINITIS DUE TO OTHER ALLERGIC TRIGGER: ICD-10-CM

## 2022-07-01 DIAGNOSIS — B00.9 HSV (HERPES SIMPLEX VIRUS) INFECTION: ICD-10-CM

## 2022-07-01 DIAGNOSIS — Z01.419 ENCOUNTER FOR GYNECOLOGICAL EXAMINATION WITHOUT ABNORMAL FINDING: Primary | ICD-10-CM

## 2022-07-01 DIAGNOSIS — Z13.1 SCREENING FOR DIABETES MELLITUS: ICD-10-CM

## 2022-07-01 PROBLEM — J30.1 SEASONAL ALLERGIC RHINITIS DUE TO POLLEN: Status: RESOLVED | Noted: 2021-06-21 | Resolved: 2022-07-01

## 2022-07-01 LAB
CHOLEST SERPL-MCNC: 193 MG/DL (ref 0–199)
CHOLEST/HDLC SERPL: 3 {RATIO} (ref 0–5)
GLUCOSE SERPL-MCNC: 85 MG/DL (ref 60–99)
HDLC SERPL-MCNC: 71 MG/DL (ref 40–150)
LDLC SERPL CALC-MCNC: 106 MG/DL (ref 0–130)
TRIGL SERPL-MCNC: 79 MG/DL (ref 0–149)

## 2022-07-01 PROCEDURE — 80061 LIPID PANEL: CPT | Performed by: PHYSICIAN ASSISTANT

## 2022-07-01 PROCEDURE — 36415 COLL VENOUS BLD VENIPUNCTURE: CPT | Performed by: PHYSICIAN ASSISTANT

## 2022-07-01 PROCEDURE — 82947 ASSAY GLUCOSE BLOOD QUANT: CPT | Performed by: PHYSICIAN ASSISTANT

## 2022-07-01 PROCEDURE — 99396 PREV VISIT EST AGE 40-64: CPT | Performed by: PHYSICIAN ASSISTANT

## 2022-07-01 RX ORDER — ACYCLOVIR 800 MG/1
800 TABLET ORAL 3 TIMES DAILY
Qty: 18 TABLET | Refills: 3 | Status: SHIPPED | OUTPATIENT
Start: 2022-07-01 | End: 2023-07-05

## 2022-07-01 NOTE — NURSING NOTE
The patient has verbalized that it is ok to leave a detailed voice message on the patient's cell phone with results/recommendations from this visit.     Thao Boyce is here for a CPX.    Pre-visit planning  Immunizations -up to date  Colonoscopy -is up to date  Mammogram -is up to date  Asthma test --  PHQ9 -  ANNE 7 -      Questioned patient about current smoking habits.  Pt. has never smoked.  Body mass index is 24.73 kg/m .  PULSE regular  My Chart: active  CLASSIFICATION OF OVERWEIGHT AND OBESITY BY BMI                        Obesity Class           BMI(kg/m2)  Underweight                                    < 18.5  Normal                                         18.5-24.9  Overweight                                     25.0-29.9  OBESITY                     I                  30.0-34.9                             II                 35.0-39.9  EXTREME OBESITY             III                >40                            Patient's  BMI Body mass index is 24.73 kg/m .

## 2022-07-01 NOTE — PROGRESS NOTES
SUBJECTIVE:   CC: Thao Boyce is an 57 year old woman who presents for preventive health visit.         Patient has been advised of split billing requirements and indicates understanding: Yes     Healthy Habits:    Do you get at least three servings of calcium containing foods daily (dairy, green leafy vegetables, etc.)? yes    Amount of exercise or daily activities, outside of work: Riding stationary bike 3 x a week    Problems taking medications regularly No    Medication side effects: No    Have you had an eye exam in the past two years? yes    Do you see a dentist twice per year? yes         Body mass index is 24.73 kg/m .          Herpes - genital    Tingling  Acyclovir - 2 outbreaks per year           Allergies  - hayfever in the fall  Claritin  ithcy eyes, stuffiness    Today's PHQ-2 Score:   PHQ-2 ( 1999 Pfizer) 7/1/2022 6/21/2021   Q1: Little interest or pleasure in doing things 0 0   Q2: Feeling down, depressed or hopeless 0 0   PHQ-2 Score 0 0   PHQ-2 Total Score (12-17 Years)- Positive if 3 or more points; Administer PHQ-A if positive - 0       Abuse: Current or Past(Physical, Sexual or Emotional)- No  Do you feel safe in your environment? Yes        Social History     Tobacco Use     Smoking status: Never Smoker     Smokeless tobacco: Never Used   Substance Use Topics     Alcohol use: Yes     Alcohol/week: 2.0 standard drinks     Types: 2 Standard drinks or equivalent per week     If you drink alcohol do you typically have >3 drinks per day or >7 drinks per week? No                     Reviewed orders with patient.  Reviewed health maintenance and updated orders accordingly - Yes  Lab work is in process  Labs reviewed in EPIC  BP Readings from Last 3 Encounters:   07/01/22 104/64   06/21/21 120/80   07/28/20 130/80    Wt Readings from Last 3 Encounters:   07/01/22 71.4 kg (157 lb 6.4 oz)   06/21/21 68.2 kg (150 lb 6.4 oz)   07/28/20 68.7 kg (151 lb 6.4 oz)                  Patient Active Problem List    Diagnosis     ACP (advance care planning)     Health Care Home     HSV (herpes simplex virus) infection -genital     Seasonal allergic rhinitis due to other allergic trigger     Past Surgical History:   Procedure Laterality Date     COLONOSCOPY  07/13/2012    ischemic colitis; repeat in 10 years     HC COLP CERVIX/UPPER VAGINA W LOOP ELEC BX CERVIX  06/01/2003     LEEP TX, CERVICAL       ZZC ANESTH,DX ARTHROSCOPIC PROC KNEE JOINT  01/01/1986    R knee - cartilage     ZZC X-RAY TEETH SINGLE  01/01/1982    wisdom pulled       Social History     Tobacco Use     Smoking status: Never Smoker     Smokeless tobacco: Never Used   Substance Use Topics     Alcohol use: Yes     Alcohol/week: 2.0 standard drinks     Types: 2 Standard drinks or equivalent per week     Family History   Problem Relation Age of Onset     Arthritis Mother         hip replacements/osteo; hip replacement     Hypertension Mother      Kidney Disease Mother         stage 4     Other - See Comments Mother         colitis     Other - See Comments Sister         MRSA     Alzheimer Disease Maternal Grandmother      Arthritis Maternal Grandfather      Heart Disease Maternal Grandfather         pacemaker         Current Outpatient Medications   Medication Sig Dispense Refill     Multiple Vitamins-Calcium (ONE-A-DAY WOMENS FORMULA) TABS Take 1 tablet by mouth as needed.       acyclovir (ZOVIRAX) 800 MG tablet Take 1 tablet (800 mg) by mouth 3 times daily For 2 days 18 tablet 3     Allergies   Allergen Reactions     Sulfa Drugs Rash     rash     Recent Labs   Lab Test 06/21/21  1238 12/27/19  0000 11/30/17  0854 12/30/16  1001 11/16/15  0923   LDL 93 113 105*   < > 108   HDL 74 83 52   < > 45*   TRIG 77 105 133   < > 129   CR  --  0.65 0.68  --  0.63   GFRESTIMATED  --   --  101  --  105   POTASSIUM  --  4.16 4.1  --  3.9    < > = values in this interval not displayed.        FHS-7: No flowsheet data found.  click delete button to remove this line  "now  Mammogram Screening: Recommended mammography every 1-2 years with patient discussion and risk factor consideration  Pertinent mammograms are reviewed under the imaging tab.    Pertinent mammograms are reviewed under the imaging tab.  History of abnormal Pap smear: NO - age 30-65 PAP every 5 years with negative HPV co-testing recommended     Reviewed and updated as needed this visit by clinical staff   Tobacco   Meds  Problems  Med Hx  Surg Hx             Reviewed and updated as needed this visit by Provider                   Past Medical History:   Diagnosis Date     ATYP SQ CELL CHNG UNDET SIG FAV DYS 1/5/2004     Recurrent vertigo 7/28/2020    10/2016,      Tinea versicolor 9/15/2014      Past Surgical History:   Procedure Laterality Date     COLONOSCOPY  07/13/2012    ischemic colitis; repeat in 10 years     HC COLP CERVIX/UPPER VAGINA W LOOP ELEC BX CERVIX  06/01/2003     LEEP TX, CERVICAL       ZZC ANESTH,DX ARTHROSCOPIC PROC KNEE JOINT  01/01/1986    R knee - cartilage     ZZC X-RAY TEETH SINGLE  01/01/1982    wisdom pulled       ROS:  CONSTITUTIONAL: NEGATIVE for fever, chills, change in weight  INTEGUMENTARY/SKIN: NEGATIVE for worrisome rashes, moles or lesions  EYES: NEGATIVE for vision changes or irritation  ENT: NEGATIVE for ear, mouth and throat problems  RESP: NEGATIVE for significant cough or SOB  BREAST: NEGATIVE for masses, tenderness or discharge  CV: NEGATIVE for chest pain, palpitations or peripheral edema  GI: NEGATIVE for nausea, abdominal pain, heartburn, or change in bowel habits  : NEGATIVE for unusual urinary or vaginal symptoms. No vaginal bleeding.  MUSCULOSKELETAL: NEGATIVE for significant arthralgias or myalgia  NEURO: NEGATIVE for weakness, dizziness or paresthesias  PSYCHIATRIC: NEGATIVE for changes in mood or affect     OBJECTIVE:   /64 (BP Location: Left arm, Patient Position: Chair, Cuff Size: Adult Regular)   Pulse 76   Resp 20   Ht 1.699 m (5' 6.9\")   Wt 71.4 " kg (157 lb 6.4 oz)   LMP 11/16/2017   BMI 24.73 kg/m    EXAM:  GENERAL APPEARANCE: healthy, alert and no distress  EYES: Eyes grossly normal to inspection, PERRL and conjunctivae and sclerae normal  HENT: ear canals and TM's normal, nose and mouth without ulcers or lesions, oropharynx clear and oral mucous membranes moist  NECK: no adenopathy, no asymmetry, masses, or scars and thyroid normal to palpation  RESP: lungs clear to auscultation - no rales, rhonchi or wheezes  BREAST: normal without masses, tenderness or nipple discharge and no palpable axillary masses or adenopathy  CV: regular rate and rhythm, normal S1 S2, no S3 or S4, no murmur, click or rub, no peripheral edema and peripheral pulses strong  ABDOMEN: soft, nontender, no hepatosplenomegaly, no masses and bowel sounds normal  MS: no musculoskeletal defects are noted and gait is age appropriate without ataxia  SKIN: no suspicious lesions or rashes  NEURO: Normal strength and tone, sensory exam grossly normal, mentation intact and speech normal  PSYCH: mentation appears normal and affect normal/bright    Diagnostic Test Results:  Labs reviewed in Epic    ASSESSMENT/PLAN:   Thao was seen today for physical.    Diagnoses and all orders for this visit:    Encounter for gynecological examination without abnormal finding    HSV (herpes simplex virus) infection -genital    Pure hypercholesterolemia  -     VENOUS COLLECTION  -     Lipid Panel (BFP)    Seasonal allergic rhinitis due to other allergic trigger    Herpes simplex disease  -     acyclovir (ZOVIRAX) 800 MG tablet; Take 1 tablet (800 mg) by mouth 3 times daily For 2 days    Screening for diabetes mellitus  -     VENOUS COLLECTION  -     Glucose Fasting (BFP)        Patient has been advised of split billing requirements and indicates understanding: Yes  COUNSELING:   Reviewed preventive health counseling, as reflected in patient instructions    Estimated body mass index is 24.73 kg/m  as calculated  "from the following:    Height as of this encounter: 1.699 m (5' 6.9\").    Weight as of this encounter: 71.4 kg (157 lb 6.4 oz).        She reports that she has never smoked. She has never used smokeless tobacco.      Counseling Resources:  ATP IV Guidelines  Pooled Cohorts Equation Calculator  Breast Cancer Risk Calculator  BRCA-Related Cancer Risk Assessment: FHS-7 Tool  FRAX Risk Assessment  ICSI Preventive Guidelines  Dietary Guidelines for Americans, 2010  USDA's MyPlate  ASA Prophylaxis  Lung CA Screening    BABAR Tom  [facility]  "

## 2022-08-05 ENCOUNTER — TRANSFERRED RECORDS (OUTPATIENT)
Dept: FAMILY MEDICINE | Facility: CLINIC | Age: 57
End: 2022-08-05

## 2022-10-09 ENCOUNTER — HEALTH MAINTENANCE LETTER (OUTPATIENT)
Age: 57
End: 2022-10-09

## 2023-01-27 ENCOUNTER — APPOINTMENT (OUTPATIENT)
Dept: GENERAL RADIOLOGY | Facility: CLINIC | Age: 58
End: 2023-01-27
Attending: EMERGENCY MEDICINE
Payer: COMMERCIAL

## 2023-01-27 ENCOUNTER — HOSPITAL ENCOUNTER (EMERGENCY)
Facility: CLINIC | Age: 58
Discharge: HOME | End: 2023-01-27
Attending: EMERGENCY MEDICINE | Admitting: EMERGENCY MEDICINE
Payer: COMMERCIAL

## 2023-01-27 VITALS
DIASTOLIC BLOOD PRESSURE: 58 MMHG | WEIGHT: 150 LBS | HEIGHT: 67 IN | RESPIRATION RATE: 18 BRPM | OXYGEN SATURATION: 95 % | TEMPERATURE: 98.4 F | SYSTOLIC BLOOD PRESSURE: 141 MMHG | HEART RATE: 80 BPM | BODY MASS INDEX: 23.54 KG/M2

## 2023-01-27 DIAGNOSIS — S92.511A CLOSED DISPLACED FRACTURE OF PROXIMAL PHALANX OF LESSER TOE OF RIGHT FOOT, INITIAL ENCOUNTER: ICD-10-CM

## 2023-01-27 PROCEDURE — 73660 X-RAY EXAM OF TOE(S): CPT | Mod: RT

## 2023-01-27 PROCEDURE — 99283 EMERGENCY DEPT VISIT LOW MDM: CPT

## 2023-01-27 PROCEDURE — 28510 TREATMENT OF TOE FRACTURE: CPT | Mod: RT

## 2023-01-27 ASSESSMENT — ACTIVITIES OF DAILY LIVING (ADL): ADLS_ACUITY_SCORE: 35

## 2023-01-27 NOTE — ED TRIAGE NOTES
Pt reports hitting right foot 2nd toe on chair this morning.      Triage Assessment     Row Name 01/27/23 0559       Triage Assessment (Adult)    Airway WDL WDL       Cardiac WDL    Cardiac WDL WDL       Peripheral/Neurovascular WDL    Peripheral Neurovascular WDL WDL       Cognitive/Neuro/Behavioral WDL    Cognitive/Neuro/Behavioral WDL WDL

## 2023-01-27 NOTE — DISCHARGE INSTRUCTIONS
Wear the buddy tape for the next 4 weeks  Use the hard soled shoe and crutches as needed for comfort  Take ibuprofen or Tylenol as needed for pain  Place ice pack to the toe for 20 to 30 minutes 3 times a day for the next 2 days

## 2023-01-27 NOTE — ED PROVIDER NOTES
"  History     Chief Complaint:  Foot Pain       HPI   Thao Boyce is a 58 year old female who presents with a toe injury on the right.  She stubbed her toe this morning on a chair.  She had immediate pain in the second toe with associated bruising.      Allergies:  Sulfa Drugs     Medications:    acyclovir (ZOVIRAX) 800 MG tablet  Multiple Vitamins-Calcium (ONE-A-DAY WOMENS FORMULA) TABS        Past Medical History:    Past Medical History:   Diagnosis Date     ATYP SQ CELL CHNG UNDET SIG FAV DYS 1/5/2004     Recurrent vertigo 7/28/2020     Tinea versicolor 9/15/2014       Past Surgical History:    Past Surgical History:   Procedure Laterality Date     COLONOSCOPY  07/13/2012    ischemic colitis; repeat in 10 years     HC COLP CERVIX/UPPER VAGINA W LOOP ELEC BX CERVIX  06/01/2003     LEEP TX, CERVICAL       ZZC ANESTH,DX ARTHROSCOPIC PROC KNEE JOINT  01/01/1986    R knee - cartilage     ZZC X-RAY TEETH SINGLE  01/01/1982    wisdom pulled        Family History:    family history includes Alzheimer Disease in her maternal grandmother; Arthritis in her maternal grandfather and mother; Heart Disease in her maternal grandfather; Hypertension in her mother; Kidney Disease in her mother; Other - See Comments in her mother and sister.    Social History:   reports that she has never smoked. She has never used smokeless tobacco. She reports current alcohol use of about 2.0 standard drinks per week. She reports that she does not use drugs.  PCP: Helena Quiroz     Physical Exam     Patient Vitals for the past 24 hrs:   BP Temp Temp src Pulse Resp SpO2 Height Weight   01/27/23 0558 (!) 141/58 98.4  F (36.9  C) Temporal 80 18 95 % 1.702 m (5' 7\") 68 kg (150 lb)        Physical Exam  The patient is resting uncomfortably on the gurney.  Eyes are normal.    Right leg: The knee, shin, ankle, and proximal foot are nontender.  The calcaneus is normal.  Attention is paid to the right second toe.  There is bruising " involving the proximal phalanx.  This extends to the distal aspect of the second metatarsal head.  The toe has a slight lateral deviation/angulation to it.  This is highly indicative of clinical fracture.  X-ray did ultimately show fracture of the proximal phalanx.  As noted below, the second toe was nicolasa taped to the great toe.  No other fractures are clinically identified.  There is normal movement to all of the toes.  No sensory abnormality.  Normal vascular pulsations to the dorsalis pedis region.    Emergency Department Course     Imaging:  XR Toe Right G/E 2 Views   Final Result   IMPRESSION: Subtle fracture involving the mid and distal aspect of the proximal phalanx of the right second digit. Associated mild soft tissue swelling. Degenerative changes involving a few IP joints. There are 2 phalanges in the little toe, normal    variant anatomy. Mild bunion deformity. Accessory ossicles adjacent to the cuboid. Bipartite medial sesamoid at the head of the first metatarsal.         Report per radiology    Procedures:  I placed a nicolasa tape of the Velcro to the right foot, great toe to the second toe.  A hard soled shoe was placed.  Crutches were adjusted and provided by the emergency department technician.      Emergency Department Course & Assessments:         Independent Interpretation (X-rays, CTs, rhythm strip):  I interpreted the foot x-ray.  I reviewed this with the patient.  There is a fracture of the second proximal phalanx.    Disposition:  The patient was discharged to home.     Impression & Plan      Medical Decision Making:  This patient presents to the emergency department after stubbing her toe/foot.  She suffered a minimally displaced fracture of the proximal phalanx, toe #2, of the right foot.  Nicolasa tape, hard soled shoe, and crutches were provided given that the patient has too much pain ambulating on this.  This should do well with conservative management.    Diagnosis:  Fracture, proximal  phalanx, right second toe      1/27/2023   Brayan Glaser MD Rock, Michael P, MD  01/27/23 0720

## 2023-07-05 ENCOUNTER — OFFICE VISIT (OUTPATIENT)
Dept: FAMILY MEDICINE | Facility: CLINIC | Age: 58
End: 2023-07-05

## 2023-07-05 VITALS
OXYGEN SATURATION: 97 % | SYSTOLIC BLOOD PRESSURE: 128 MMHG | HEIGHT: 66 IN | WEIGHT: 160 LBS | TEMPERATURE: 97.4 F | HEART RATE: 69 BPM | DIASTOLIC BLOOD PRESSURE: 74 MMHG | BODY MASS INDEX: 25.71 KG/M2

## 2023-07-05 DIAGNOSIS — D17.30 LIPOMA OF SKIN AND SUBCUTANEOUS TISSUE: ICD-10-CM

## 2023-07-05 DIAGNOSIS — Z12.4 SCREENING FOR CERVICAL CANCER: ICD-10-CM

## 2023-07-05 DIAGNOSIS — Z01.419 ENCOUNTER FOR GYNECOLOGICAL EXAMINATION WITHOUT ABNORMAL FINDING: Primary | ICD-10-CM

## 2023-07-05 DIAGNOSIS — Z13.1 SCREENING FOR DIABETES MELLITUS: ICD-10-CM

## 2023-07-05 DIAGNOSIS — Z12.31 ENCOUNTER FOR SCREENING MAMMOGRAM FOR BREAST CANCER: ICD-10-CM

## 2023-07-05 DIAGNOSIS — B00.9 HERPES SIMPLEX DISEASE: ICD-10-CM

## 2023-07-05 DIAGNOSIS — Z13.220 SCREENING FOR LIPID DISORDERS: ICD-10-CM

## 2023-07-05 LAB
CHOLEST SERPL-MCNC: 218 MG/DL (ref 0–199)
CHOLEST/HDLC SERPL: 3 {RATIO} (ref 0–5)
GLUCOSE SERPL-MCNC: 84 MG/DL (ref 60–99)
HDLC SERPL-MCNC: 69 MG/DL (ref 40–150)
LDLC SERPL CALC-MCNC: 126 MG/DL (ref 0–130)
TRIGL SERPL-MCNC: 113 MG/DL (ref 0–149)

## 2023-07-05 PROCEDURE — 82947 ASSAY GLUCOSE BLOOD QUANT: CPT | Performed by: PHYSICIAN ASSISTANT

## 2023-07-05 PROCEDURE — 99396 PREV VISIT EST AGE 40-64: CPT | Performed by: PHYSICIAN ASSISTANT

## 2023-07-05 PROCEDURE — 80061 LIPID PANEL: CPT | Performed by: PHYSICIAN ASSISTANT

## 2023-07-05 PROCEDURE — 36415 COLL VENOUS BLD VENIPUNCTURE: CPT | Performed by: PHYSICIAN ASSISTANT

## 2023-07-05 RX ORDER — VITAMIN B COMPLEX
1 TABLET ORAL DAILY
COMMUNITY

## 2023-07-05 RX ORDER — ACYCLOVIR 800 MG/1
800 TABLET ORAL 3 TIMES DAILY
Qty: 18 TABLET | Refills: 3 | Status: SHIPPED | OUTPATIENT
Start: 2023-07-05

## 2023-07-05 NOTE — PROGRESS NOTES
Chief Complaint:  Physical Exam    SUBJECTIVE:   Thao Boyce is a 58 year old female presents for routine health maintenance.    Current concerns: Bump on right shin 2 months. Not painful.     Menses are absent    Patient's last menstrual period was 11/16/2017.     Was last Pap smear normal: Yes  Due for mammogram:  Yes    Body mass index is 25.82 kg/m .    Present exercise habits:  Nothing formal  Present dietary habits:  eats regular meals and follows a balanced nutrition diet    Calcium intake:  2-3 servings daily  Vit D intake: is taking supplement    Is the patient a smoker? No  If yes, smoking cessation advised and counseling provided.     Cardiovascular risk factors: none    Over the past few weeks, have you felt down or depressed? Little interest or pleasure in doing things? No concerns    If in a relationship are there any Domestic violence concern: No    Last dental appointment:  this year  Last optical appointment:  this year    Was the patient born between [birth year] and has not had Hep C testing?  Patient has already been tested    I have reviewed the following histories: Past Medical History, Past Surgical History, Social History, Family History, Problem List, Medication List and Allergies    Past Medical History:   Diagnosis Date     ATYP SQ CELL CHNG UNDET SIG FAV DYS 1/5/2004     Recurrent vertigo 7/28/2020    10/2016,      Tinea versicolor 9/15/2014     Family History   Problem Relation Age of Onset     Arthritis Mother         hip replacements/osteo; hip replacement     Hypertension Mother      Kidney Disease Mother         stage 4     Other - See Comments Mother         colitis     Other - See Comments Sister         MRSA     Alzheimer Disease Maternal Grandmother      Arthritis Maternal Grandfather      Heart Disease Maternal Grandfather         pacemaker     Social History     Socioeconomic History     Marital status: Single     Spouse name: Not on file     Number of children: Not on file      Years of education: Not on file     Highest education level: Not on file   Occupational History     Not on file   Tobacco Use     Smoking status: Never     Passive exposure: Never     Smokeless tobacco: Never   Substance and Sexual Activity     Alcohol use: Yes     Alcohol/week: 2.0 standard drinks of alcohol     Types: 2 Standard drinks or equivalent per week     Drug use: No     Sexual activity: Yes     Partners: Male   Other Topics Concern     Not on file   Social History Narrative     Not on file     Social Determinants of Health     Financial Resource Strain: Not on file   Food Insecurity: Not on file   Transportation Needs: Not on file   Physical Activity: Not on file   Stress: Not on file   Social Connections: Not on file   Intimate Partner Violence: Not on file   Housing Stability: Not on file     Past Surgical History:   Procedure Laterality Date     COLONOSCOPY  07/13/2012    ischemic colitis; repeat in 10 years     HC COLP CERVIX/UPPER VAGINA W LOOP ELEC BX CERVIX  06/01/2003     LEEP TX, CERVICAL       ZZC ANESTH,DX ARTHROSCOPIC PROC KNEE JOINT  01/01/1986    R knee - cartilage     ZZC X-RAY TEETH SINGLE  01/01/1982    wisdom pulled       ROS:  E/M: NEGATIVE for ear, nose, mouth and throat problems  R: NEGATIVE for significant/chronic cough or SOB  CV: NEGATIVE for chest pain or palpitations  GI: NEGATIVE for abdominal pain, chronic diarrhea or constipation  :  NEGATIVE for dysuria, hematuria or vaginal discharge. No sexual health concerns.       Current Outpatient Medications   Medication     acyclovir (ZOVIRAX) 800 MG tablet     Multiple Vitamins-Calcium (ONE-A-DAY WOMENS FORMULA) TABS     Vitamin D3 (CHOLECALCIFEROL) 25 mcg (1000 units) tablet     No current facility-administered medications for this visit.       Patient Active Problem List    Diagnosis Date Noted     Seasonal allergic rhinitis due to other allergic trigger 06/21/2021     Priority: Medium     HSV (herpes simplex virus) infection  "-genital 11/16/2015     Priority: Medium     Health Care Home 09/13/2013     Priority: Medium     State Tier Level:  Tier 1  Status:  n/a  Care Coordinator:   See Letters for [facility] Care Plan           ACP (advance care planning) 07/27/2012     Priority: Medium     Advance Care Planning 11/16/2015: ACP Review and Resources Provided:  Reviewed chart for advance care plan.  Thao Boyce has no plan or code status on file. Discussed available resources and provided with information. Confirmed code status reflects current choices pending further ACP discussions.  Confirmed/documented legally designated decision maker(s). Added by Camelia Jarrell                 OBJECTIVE:  /74 (BP Location: Right arm, Patient Position: Sitting, Cuff Size: Adult Regular)   Pulse 69   Temp 97.4  F (36.3  C) (Temporal)   Ht 1.676 m (5' 6\")   Wt 72.6 kg (160 lb)   LMP 11/16/2017   SpO2 97%   BMI 25.82 kg/m      General: 58 year old female who appears her stated age. Vital signs noted  Head: Normocephalic  Eyes: pupils equal round reactive to light and accomodation, extra ocular movements intact  Ears: external canals and TMs free of abnormalities  Nose: patent, without mucosal abnormalities  Mouth and throat: without erythema or lesions of the mucosa  Neck: supple, without adenopathy or thyromegaly  Lungs: clear to auscultation, no wheezing or crackles  Breasts: skin without rash, no dominant mass, no nipple discharge, or axillary adenopathy  Cv: regular rate and rhythm, normal s1 and s2 without murmur or click  Abd: soft, non-tender, no masses, no hepatomegaly or splenomegaly.   (female): normal female external genitalia, normal urethral meatus, vaginal mucosa, normal cervix/adnexa/uterus without masses or discharge  Ms: normal muscle tone & symmetry  Skin: clear to inspection. Small semi fluctuant lesion 4 cm by 2 cm medial leg. No erythema, tenderness.  Neuro: sensation and motor function grossly intact; cranial nerves " "without obvious abnormalities.    ASSESSMENT/PLAN:    Encounter for gynecological examination without abnormal finding  Lipoma  Thao is doing well today. Will update fasting labs, pap and send MyChart. Skin change on inner leg consistent with lipoma. Continue to monitor, and contact us if worsening.     Screening for diabetes mellitus  - VENOUS COLLECTION  - Glucose Fasting (BFP)    Screening for lipid disorders  - VENOUS COLLECTION  - Lipid Panel (BFP)    Screening for cervical cancer  - ThinPrep Pap and HPV (mRNa E6/E7) ([facility])    Herpes simplex disease  Will refill medication without change for 1 year.   - acyclovir (ZOVIRAX) 800 MG tablet; Take 1 tablet (800 mg) by mouth 3 times daily For 2 days    Encounter for screening mammogram for breast cancer  - MA Screening Bilateral w/ [facility]  - Radiology Referral     reports that she has never smoked. She has never been exposed to tobacco smoke. She has never used smokeless tobacco.    Estimated body mass index is 25.82 kg/m  as calculated from the following:    Height as of this encounter: 1.676 m (5' 6\").    Weight as of this encounter: 72.6 kg (160 lb).    Labs pending:      Fasting glucose      Fasting lipids  Meds Suggested:      Vitamin D       Calcium  Tests Recommended:      Regular Dental Examinations        Eye exam        Mammogram yearly  Behavior Modifications:       Cardiovascular exercise 3 times per week--enough to get your Target Heart rate  Other recommendations:     BMI noted and discussed      Regular breast exam     Encouraged My Chart    Counseling Resources:  ATP IV Guidelines  Pooled Cohorts Equation Calculator  Breast Cancer Risk Calculator  FRAX Risk Assessment  ICSI Preventive Guidelines  Dietary Guidelines for Americans, 2010  USDA's MyPlate        Stacia Barcenas PA-C  7/4/2023    "

## 2023-07-05 NOTE — NURSING NOTE
Chief Complaint   Patient presents with     Physical     Fasting cpx    Lump on lower right shin         Pre-visit Screening:  Immunizations:  up to date  Colonoscopy:  is up to date  Mammogram: is due and ordered today  Asthma Action Test/Plan:  NA  PHQ9:  NA  GAD7:  NA  Questioned patient about current smoking habits Pt. has never smoked.  Ok to leave detailed message on voice mail for today's visit only Yes, phone # [phone]

## 2023-07-07 LAB
CLINICAL HISTORY - QUEST: NORMAL
COMMENT - QUEST: NORMAL
CYTOTECHNOLOGIST - QUEST: NORMAL
DESCRIPTIVE DIAGNOSIS - QUEST: NORMAL
HPV MRNA E6/E7: NOT DETECTED
LAST PAP DX - QUEST: NORMAL
LMP - QUEST: NORMAL
PREV BX DX - QUEST: NORMAL
SOURCE: NORMAL
STATEMENT OF ADEQUACY - QUEST: NORMAL

## 2023-07-28 LAB — MAMMOGRAM: NORMAL

## 2023-08-29 ENCOUNTER — OFFICE VISIT (OUTPATIENT)
Dept: FAMILY MEDICINE | Facility: CLINIC | Age: 58
End: 2023-08-29

## 2023-08-29 VITALS
DIASTOLIC BLOOD PRESSURE: 68 MMHG | OXYGEN SATURATION: 98 % | BODY MASS INDEX: 25.88 KG/M2 | WEIGHT: 161 LBS | SYSTOLIC BLOOD PRESSURE: 114 MMHG | HEIGHT: 66 IN | TEMPERATURE: 98.3 F | HEART RATE: 69 BPM

## 2023-08-29 DIAGNOSIS — R22.41 LEG MASS, RIGHT: Primary | ICD-10-CM

## 2023-08-29 PROCEDURE — 99213 OFFICE O/P EST LOW 20 MIN: CPT | Performed by: PHYSICIAN ASSISTANT

## 2023-08-29 RX ORDER — TRIAMCINOLONE ACETONIDE 1 MG/G
CREAM TOPICAL
COMMUNITY
Start: 2023-08-02

## 2023-08-29 RX ORDER — METRONIDAZOLE 7.5 MG/G
GEL TOPICAL 2 TIMES DAILY
COMMUNITY

## 2023-08-29 NOTE — NURSING NOTE
Chief Complaint   Patient presents with    Mass     Lump on lower right leg, possibly needing an ultrasound per what dermatologist said         Pre-visit Screening:  Immunizations:  up to date  Colonoscopy:  is up to date  Mammogram: is up to date  Asthma Action Test/Plan:  NA  PHQ9:  NA  GAD7:  NA  Questioned patient about current smoking habits Pt. has never smoked.  Ok to leave detailed message on voice mail for today's visit only Yes, phone # [phone]  e

## 2023-08-30 NOTE — PROGRESS NOTES
"CC: Lump on right leg    History:  Thao returns today to recheck a lump on her right let that has been there for several months. The lump is not painful. Does not seem to be changing. Went to her dermatologist who wasn't sure and recommended imaging through her PCP.     PMH, MEDICATIONS, ALLERGIES, SOCIAL AND FAMILY HISTORY in [facility] and reviewed by me personally.    ROS negative other than the symptoms noted above in the HPI.    Examination   /68 (BP Location: Right arm, Patient Position: Sitting, Cuff Size: Adult Large)   Pulse 69   Temp 98.3  F (36.8  C) (Temporal)   Ht 1.676 m (5' 6\")   Wt 73 kg (161 lb)   LMP 11/16/2017   SpO2 98%   BMI 25.99 kg/m       Constitutional: Sitting comfortably, in no acute distress. Vital signs noted  SKIN: No jaundice/pallor. Mild fluctuant mass 5 cm by 3 cm on proximal medial right leg. No erythema. No tenderness to palpation.   Psychiatric: mentation appears normal and affect normal/bright      A/P    ICD-10-CM    1. Leg mass, right  R22.41 US Lower Extremity Venous Duplex Right     Radiology Referral          DISCUSSION:  Leg mass:  Suspect lipoma versus cyst. Agreed to order US of RLE to assess soft tissue changes as well as confirm normal underlying blood flow. I will submit order for this and contact with results when available. Most likely benign and can be monitored    follow up visit: As needed    Stacia Barcenas PA-C  [facility]    "

## 2024-04-19 ENCOUNTER — OFFICE VISIT (OUTPATIENT)
Dept: FAMILY MEDICINE | Facility: CLINIC | Age: 59
End: 2024-04-19

## 2024-04-19 VITALS
OXYGEN SATURATION: 97 % | RESPIRATION RATE: 18 BRPM | HEART RATE: 61 BPM | DIASTOLIC BLOOD PRESSURE: 70 MMHG | WEIGHT: 156 LBS | BODY MASS INDEX: 25.07 KG/M2 | SYSTOLIC BLOOD PRESSURE: 102 MMHG | HEIGHT: 66 IN | TEMPERATURE: 98 F

## 2024-04-19 DIAGNOSIS — E78.00 PURE HYPERCHOLESTEROLEMIA: ICD-10-CM

## 2024-04-19 DIAGNOSIS — Z13.228 SCREENING FOR METABOLIC DISORDER: ICD-10-CM

## 2024-04-19 DIAGNOSIS — Z13.0 SCREENING FOR DEFICIENCY ANEMIA: ICD-10-CM

## 2024-04-19 DIAGNOSIS — Z13.29 SCREENING FOR THYROID DISORDER: ICD-10-CM

## 2024-04-19 DIAGNOSIS — Z00.00 WELL WOMAN EXAM WITHOUT GYNECOLOGICAL EXAM: Primary | ICD-10-CM

## 2024-04-19 DIAGNOSIS — B00.9 HERPES SIMPLEX DISEASE: ICD-10-CM

## 2024-04-19 LAB
ALBUMIN SERPL-MCNC: 4.2 G/DL (ref 3.6–5.1)
ALBUMIN/GLOB SERPL: 1.5 {RATIO} (ref 1–2.5)
ALP SERPL-CCNC: 77 U/L (ref 33–130)
ALT 1742-6: 12 U/L (ref 0–32)
AST 1920-8: 12 U/L (ref 0–35)
BILIRUB SERPL-MCNC: 0.6 MG/DL (ref 0.2–1.2)
BUN SERPL-MCNC: 15 MG/DL (ref 7–25)
BUN/CREATININE RATIO: 22.1 (ref 6–32)
CALCIUM SERPL-MCNC: 9.1 MG/DL (ref 8.6–10.3)
CHLORIDE SERPLBLD-SCNC: 104.6 MMOL/L (ref 98–110)
CHOLEST SERPL-MCNC: 201 MG/DL (ref 0–199)
CHOLEST/HDLC SERPL: 3 {RATIO} (ref 0–5)
CO2 SERPL-SCNC: 31.4 MMOL/L (ref 20–32)
CREAT SERPL-MCNC: 0.68 MG/DL (ref 0.6–1.3)
ERYTHROCYTE [DISTWIDTH] IN BLOOD BY AUTOMATED COUNT: 11.9 %
GLOBULIN, CALCULATED - QUEST: 2.8 (ref 1.9–3.7)
GLUCOSE SERPL-MCNC: 91 MG/DL (ref 60–99)
HCT VFR BLD AUTO: 40 % (ref 35–47)
HDLC SERPL-MCNC: 71 MG/DL (ref 40–150)
HEMOGLOBIN: 13.1 G/DL (ref 11.7–15.7)
LDLC SERPL CALC-MCNC: 116 MG/DL (ref 0–130)
MCH RBC QN AUTO: 30.1 PG (ref 26–33)
MCHC RBC AUTO-ENTMCNC: 32.8 G/DL (ref 31–36)
MCV RBC AUTO: 91.9 FL (ref 78–100)
PLATELET COUNT - QUEST: 360 10^9/L (ref 150–375)
POTASSIUM SERPL-SCNC: 4.52 MMOL/L (ref 3.5–5.3)
PROT SERPL-MCNC: 7 G/DL (ref 6.1–8.1)
RBC # BLD AUTO: 4.35 10*12/L (ref 3.8–5.2)
SODIUM SERPL-SCNC: 141.9 MMOL/L (ref 135–146)
TRIGL SERPL-MCNC: 69 MG/DL (ref 0–149)
WBC # BLD AUTO: 6.2 10*9/L (ref 4–11)

## 2024-04-19 PROCEDURE — 80053 COMPREHEN METABOLIC PANEL: CPT

## 2024-04-19 PROCEDURE — 85027 COMPLETE CBC AUTOMATED: CPT

## 2024-04-19 PROCEDURE — 80061 LIPID PANEL: CPT

## 2024-04-19 PROCEDURE — 36415 COLL VENOUS BLD VENIPUNCTURE: CPT

## 2024-04-19 PROCEDURE — 99396 PREV VISIT EST AGE 40-64: CPT

## 2024-04-19 NOTE — PROGRESS NOTES
Preventive Care Visit  [facility]  Jazmine Medina PA-C, Family Medicine  Apr 19, 2024       SUBJECTIVE:   Thao is a 59 year old, presenting for the following:  Physical (Annual, fasting )      HPI    Thao presents for her yearly physical.     Daily medications: Reviewed. Acyclovir PRN for genital herpes, works well, does not need any refills at this time.     Concerns: None.     Past medical history and daily medications reviewed. Reviewed past medical history, surgical history, family history, and social history below.     Social history:  -Diet: Tries to eat a well balanced diet, has chicken and ground turkey for protein, good amounts of vegetables, not as many fruits, good amount of cheese for calcium.   -Water: Drinks a good amount of water throughout the day, 2 cups of coffee every morning.   -Exercise: Active, works out about three times a week for an hour total.   -Sleep: No concerns, sleeps well most nights, has improved since resigning from her job earlier this month.   -Daily vitamins: Multivitamin, vitamin D3.   -Dentist: Twice a year.   -Eye doctor: Annually, wears glasses.   -Smoking: None.   -Alcohol: Occasionally.   -LMP: Postmenopausal, no episodes of vaginal bleeding or spotting.      Screenings:  -Immunizations: Encouraged COVID booster from local pharmacy.   -Lab work: CBC, CMP, lipid, TSH.   -Pap smear: Up to date, due 07/2028.   -Mammogram: Due 07/2024.   -Colonoscopy: Up to date, due 08/2032.     Social History     Tobacco Use    Smoking status: Never     Passive exposure: Never    Smokeless tobacco: Never   Substance Use Topics    Alcohol use: Yes     Alcohol/week: 2.0 standard drinks of alcohol     Types: 2 Standard drinks or equivalent per week     Reviewed orders with patient.  Reviewed health maintenance and updated orders accordingly - Yes    Lab work is in process.    Breast Cancer Screening: Any new diagnosis of family breast, ovarian, or bowel cancer? No  "    Mammogram Screening - Mammogram every 1-2 years updated in Health Maintenance based on mutual decision making. Pertinent mammograms are reviewed under the imaging tab.    History of abnormal Pap smear: YES in past however most recent pap smears have all been normal - age 30-65 PAP every 5 years with negative HPV co-testing recommended    Reviewed and updated as needed this visit by clinical staff   Tobacco  Allergies  Meds   Med Hx  Surg Hx  Fam Hx  Soc Hx      Reviewed and updated as needed this visit by Provider   Tobacco  Allergies  Meds   Med Hx  Surg Hx  Fam Hx  Soc Hx Sexual   Activity        Review of Systems  CONSTITUTIONAL: NEGATIVE for fever, chills, change in weight  INTEGUMENTARY/SKIN: NEGATIVE for worrisome rashes, moles or lesions  EYES: NEGATIVE for vision changes or irritation  ENT/MOUTH: NEGATIVE for ear, mouth and throat problems  RESP: NEGATIVE for significant cough or SOB  BREAST: NEGATIVE for masses, tenderness or discharge  CV: NEGATIVE for chest pain, palpitations or peripheral edema  GI: NEGATIVE for nausea, abdominal pain, heartburn, or change in bowel habits  : NEGATIVE for frequency, dysuria, or hematuria  MUSCULOSKELETAL: NEGATIVE for significant arthralgias or myalgia  NEURO: NEGATIVE for weakness, dizziness or paresthesias  ENDOCRINE: NEGATIVE for temperature intolerance, skin/hair changes  HEME: NEGATIVE for bleeding problems  PSYCHIATRIC: NEGATIVE for changes in mood or affect    OBJECTIVE:   /70 (BP Location: Left arm, Patient Position: Sitting, Cuff Size: Adult Large)   Pulse 61   Temp 98  F (36.7  C) (Temporal)   Resp 18   Ht 1.676 m (5' 6\")   Wt 70.8 kg (156 lb)   LMP 11/16/2017   SpO2 97%   BMI 25.18 kg/m     Estimated body mass index is 25.18 kg/m  as calculated from the following:    Height as of this encounter: 1.676 m (5' 6\").    Weight as of this encounter: 70.8 kg (156 lb).    Physical Exam  GENERAL: alert and no distress  EYES: Eyes grossly " normal to inspection, PERRL and conjunctivae and sclerae normal  HENT: ear canals and TM's normal, nose and mouth without ulcers or lesions  NECK: no adenopathy, no asymmetry, masses, or scars  RESP: lungs clear to auscultation - no rales, rhonchi or wheezes  BREAST: normal without masses, tenderness or nipple discharge and no palpable axillary masses or adenopathy  CV: regular rate and rhythm, normal S1 S2, no S3 or S4, no murmur, click or rub, no peripheral edema  ABDOMEN: soft, nontender, no hepatosplenomegaly, no masses and bowel sounds normal   (female): declines, no concerns  MS: no gross musculoskeletal defects noted, no edema  SKIN: no suspicious lesions or rashes  NEURO: Normal strength and tone, mentation intact and speech normal  PSYCH: mentation appears normal, affect normal/bright    Lab work pending.     ASSESSMENT/PLAN:     Well woman exam without gynecological exam  - Thao is doing well today. Encouraged healthy eating habits, daily exercise, reviewed screenings and immunizations, etc.     Herpes simplex disease  - Well controlled per patient, she will let us know when she needs a refill of Acyclovir.     Pure hypercholesterolemia  - Labs pending, patient will be notified of results.   - VENOUS COLLECTION  - Lipid Panel ([facility])    Screening for metabolic disorder  - Labs pending, patient will be notified of results.   - VENOUS COLLECTION  - Comprehensive Metobolic Panel ([facility])    Screening for thyroid disorder  - Labs pending, patient will be notified of results.   - VENOUS COLLECTION  - TSH WITH FREE T4 REFLEX ([facility])    Screening for deficiency anemia  - Labs pending, patient will be notified of results.   - Hemogram Platelet ([facility])    Counseling  Reviewed preventive health counseling, as reflected in patient instructions       Regular exercise       Healthy diet/nutrition       Vision screening       Alcohol Use       Osteoporosis prevention/bone health       Colorectal Cancer  "Screening    BMI  Estimated body mass index is 25.18 kg/m  as calculated from the following:    Height as of this encounter: 1.676 m (5' 6\").    Weight as of this encounter: 70.8 kg (156 lb).     Weight management plan: Discussed healthy diet and exercise guidelines    She reports that she has never smoked. She has never been exposed to tobacco smoke. She has never used smokeless tobacco.            Signed Electronically by: Jazmine Medina PA-C  "

## 2024-04-19 NOTE — PATIENT INSTRUCTIONS
Thanks for coming in today Thao.     I will send you a my chart with your lab results.     Please let me know if you have any questions or concerns.

## 2024-04-19 NOTE — NURSING NOTE
Chief Complaint   Patient presents with    Physical     Annual, fasting      Pre-visit Screening:  Immunizations:  up to date  Colonoscopy:  is up to date  Mammogram: is up to date  Asthma Action Test/Plan:  na  PHQ9:  phq2 done today   GAD7:  na  Questioned patient about current smoking habits Pt. has never smoked.  Ok to leave detailed message on voice mail for today's visit only yes, phone # [phone] (home)

## 2024-04-20 LAB — TSH SERPL-ACNC: 1.43 MIU/L (ref 0.4–4.5)

## 2025-04-23 ENCOUNTER — OFFICE VISIT (OUTPATIENT)
Dept: FAMILY MEDICINE | Facility: CLINIC | Age: 60
End: 2025-04-23

## 2025-04-23 VITALS
HEIGHT: 67 IN | OXYGEN SATURATION: 99 % | SYSTOLIC BLOOD PRESSURE: 120 MMHG | BODY MASS INDEX: 26.02 KG/M2 | DIASTOLIC BLOOD PRESSURE: 76 MMHG | TEMPERATURE: 97.2 F | HEART RATE: 85 BPM | WEIGHT: 165.8 LBS

## 2025-04-23 DIAGNOSIS — Z13.228 SCREENING FOR METABOLIC DISORDER: ICD-10-CM

## 2025-04-23 DIAGNOSIS — Z00.00 ENCOUNTER FOR GENERAL HEALTH EXAMINATION: Primary | ICD-10-CM

## 2025-04-23 DIAGNOSIS — Z13.220 SCREENING FOR LIPID DISORDERS: ICD-10-CM

## 2025-04-23 DIAGNOSIS — B00.9 HERPES SIMPLEX DISEASE: ICD-10-CM

## 2025-04-23 LAB
ALBUMIN SERPL-MCNC: 4.3 G/DL (ref 3.6–5.1)
ALP SERPL-CCNC: 76 U/L (ref 33–130)
ALT 1742-6: 13 U/L (ref 0–32)
AST 1920-8: 19 U/L (ref 0–35)
BILIRUB SERPL-MCNC: 0.7 MG/DL (ref 0.2–1.2)
BUN SERPL-MCNC: 12 MG/DL (ref 7–25)
BUN/CREATININE RATIO: 17 (ref 6–32)
CALCIUM SERPL-MCNC: 9.6 MG/DL (ref 8.6–10.3)
CHLORIDE SERPLBLD-SCNC: 107.8 MMOL/L (ref 98–110)
CHOLEST SERPL-MCNC: 219 MG/DL (ref 0–199)
CHOLEST/HDLC SERPL: 3 {RATIO} (ref 0–5)
CO2 SERPL-SCNC: 31.2 MMOL/L (ref 20–32)
CREAT SERPL-MCNC: 0.69 MG/DL (ref 0.6–1.3)
GLUCOSE SERPL-MCNC: 83 MG/DL (ref 60–99)
HDLC SERPL-MCNC: 67 MG/DL (ref 40–150)
LDLC SERPL CALC-MCNC: 123 MG/DL (ref 0–129)
POTASSIUM SERPL-SCNC: 4.09 MMOL/L (ref 3.5–5.3)
PROT SERPL-MCNC: 7 G/DL (ref 6.1–8.1)
SODIUM SERPL-SCNC: 138.6 MMOL/L (ref 135–146)
TRIGL SERPL-MCNC: 145 MG/DL (ref 0–149)

## 2025-04-23 RX ORDER — ACYCLOVIR 800 MG/1
800 TABLET ORAL 3 TIMES DAILY
Qty: 18 TABLET | Refills: 3 | Status: SHIPPED | OUTPATIENT
Start: 2025-04-23

## 2025-04-23 NOTE — PROGRESS NOTES
Chief Complaint:  Physical Exam    SUBJECTIVE:   Thao Boyce is a 60 year old female presents for routine health maintenance.    Current concerns: Refills of acyclovir. Working well.     Menses are absent    Patient's last menstrual period was 11/16/2017.  Was last Pap smear normal: Yes  Due for mammogram:  No    Body mass index is 26.36 kg/m .    Present exercise habits:  Increasing exercise 30 minutes  Present dietary habits:  eats regular meals and follows a balanced nutrition diet    Calcium intake:  2-3 servings daily, plus MV.   Vit D intake: is taking supplement    Is the patient a smoker? No  If yes, smoking cessation advised and counseling provided.     Cardiovascular risk factors: none    Over the past few weeks, have you felt down or depressed? Little interest or pleasure in doing things? No concern.     If in a relationship are there any Domestic violence concern: No    Last dental appointment:  this year  Last optical appointment:  this year    Was the patient born between [birth year] and has not had Hep C testing?  Patient has already been tested    I have reviewed the following histories: Past Medical History, Past Surgical History, Social History, Family History, Problem List, Medication List and Allergies    Past Medical History:   Diagnosis Date    ATYP SQ CELL CHNG UNDET SIG FAV DYS 1/5/2004    Recurrent vertigo 7/28/2020    10/2016,     Tinea versicolor 9/15/2014     Family History   Problem Relation Age of Onset    Arthritis Mother         hip replacements/osteo; hip replacement    Hypertension Mother     Kidney Disease Mother         stage 4    Other - See Comments Mother         colitis    Other - See Comments Sister         MRSA    Alzheimer Disease Maternal Grandmother     Arthritis Maternal Grandfather     Heart Disease Maternal Grandfather         pacemaker    Breast Cancer No family hx of     Colon Cancer No family hx of     Myocardial Infarction No family hx of     Cerebrovascular  Disease No family hx of      Social History     Socioeconomic History    Marital status: Single     Spouse name: Not on file    Number of children: Not on file    Years of education: Not on file    Highest education level: Not on file   Occupational History    Not on file   Tobacco Use    Smoking status: Never     Passive exposure: Never    Smokeless tobacco: Never   Vaping Use    Vaping status: Never Used   Substance and Sexual Activity    Alcohol use: Yes     Alcohol/week: 3.0 - 4.0 standard drinks of alcohol     Types: 3 - 4 Standard drinks or equivalent per week     Comment: occasionally    Drug use: No    Sexual activity: Yes     Partners: Male     Comment: in a relationship   Other Topics Concern    Not on file   Social History Narrative    Not on file     Social Drivers of Health     Financial Resource Strain: Not on file   Food Insecurity: Not on file   Transportation Needs: Not on file   Physical Activity: Not on file   Stress: Not on file   Social Connections: Not on file   Interpersonal Safety: Not on file   Housing Stability: Not on file     Past Surgical History:   Procedure Laterality Date    COLONOSCOPY  07/13/2012    ischemic colitis; repeat in 10 years    HC COLP CERVIX/UPPER VAGINA W LOOP ELEC BX CERVIX  06/01/2003    LEEP TX, CERVICAL      ZZC ANESTH,DX ARTHROSCOPIC PROC KNEE JOINT  01/01/1986    R knee - cartilage    ZZC X-RAY TEETH SINGLE  01/01/1982    wisdom pulled       ROS:  E/M: NEGATIVE for ear, nose, mouth and throat problems  R: NEGATIVE for significant/chronic cough or SOB  CV: NEGATIVE for chest pain or palpitations  GI: NEGATIVE for abdominal pain, chronic diarrhea or constipation  :  NEGATIVE for dysuria, hematuria or vaginal discharge. No sexual health concerns.       Current Outpatient Medications   Medication Sig Dispense Refill    acyclovir (ZOVIRAX) 800 MG tablet Take 1 tablet (800 mg) by mouth 3 times daily. For 2 days 18 tablet 3    metroNIDAZOLE (METROGEL) 0.75 % external  "gel Apply topically 2 times daily      Multiple Vitamins-Calcium (ONE-A-DAY WOMENS FORMULA) TABS Take 1 tablet by mouth as needed.      triamcinolone (KENALOG) 0.1 % external cream APPLY TO ITCHY AREAS ON CHEST TWICE A DAY AS NEEDED      Vitamin D3 (CHOLECALCIFEROL) 25 mcg (1000 units) tablet Take 1 tablet by mouth daily       No current facility-administered medications for this visit.       Patient Active Problem List    Diagnosis Date Noted    Seasonal allergic rhinitis due to other allergic trigger 06/21/2021     Priority: Medium    HSV (herpes simplex virus) infection -genital 11/16/2015     Priority: Medium    ACP (advance care planning) 07/27/2012     Priority: Medium     Advance Care Planning 11/16/2015: ACP Review and Resources Provided:  Reviewed chart for advance care plan.  Thao MEJIA Carli has no plan or code status on file. Discussed available resources and provided with information. Confirmed code status reflects current choices pending further ACP discussions.  Confirmed/documented legally designated decision maker(s). Added by Camelia Jarrell                   OBJECTIVE:  /76 (BP Location: Left arm, Patient Position: Sitting, Cuff Size: Adult Regular)   Pulse 85   Temp 97.2  F (36.2  C) (Temporal)   Ht 1.689 m (5' 6.5\")   Wt 75.2 kg (165 lb 12.8 oz)   LMP 11/16/2017   SpO2 99%   BMI 26.36 kg/m        General: 60 year old female who appears her stated age. Vital signs noted  Head: Normocephalic  Eyes: pupils equal round reactive to light and accomodation, extra ocular movements intact  Ears: external canals and TMs free of abnormalities  Nose: patent, without mucosal abnormalities  Mouth and throat: without erythema or lesions of the mucosa  Neck: supple, without adenopathy or thyromegaly  Lungs: clear to auscultation, no wheezing or crackles  Breasts: Declined. No concerns.   Cv: regular rate and rhythm, normal s1 and s2 without murmur or click  Abd: soft, non-tender, no masses, no " "hepatomegaly or splenomegaly.   (female): Declined. No concerns.   Ms: normal muscle tone & symmetry  Skin: clear to inspection and with no palpable abnormalities.  Neuro: sensation and motor function grossly intact; cranial nerves without obvious abnormalities.    ASSESSMENT/PLAN:    Encounter for general health examination  Pt is doing well today. Will check fasting labs today and send MyChart with results when available. Will refill medication without change for 1 year.     Herpes simplex disease  - acyclovir (ZOVIRAX) 800 MG tablet; Take 1 tablet (800 mg) by mouth 3 times daily. For 2 days    Screening for lipid disorders  - VENOUS COLLECTION  - Lipid Panel ([facility])    Screening for metabolic disorder  - VENOUS COLLECTION  - Comprehensive Metobolic Panel ([facility])     reports that she has never smoked. She has never been exposed to tobacco smoke. She has never used smokeless tobacco.      Estimated body mass index is 26.36 kg/m  as calculated from the following:    Height as of this encounter: 1.689 m (5' 6.5\").    Weight as of this encounter: 75.2 kg (165 lb 12.8 oz).        Labs pending:      Fasting glucose      Fasting lipids  Meds Suggested:      Vitamin D       Calcium  Tests Recommended:      Regular Dental Examinations         Mammogram yearly  Behavior Modifications:       Cardiovascular exercise 3 times per week--enough to get your Target Heart rate  Immunizations suggested:       [id number] completed today  Other recommendations:      Regular breast exam     Encouraged My Chart    Counseling Resources:  ATP IV Guidelines  Pooled Cohorts Equation Calculator  Breast Cancer Risk Calculator  FRAX Risk Assessment  ICSI Preventive Guidelines  Dietary Guidelines for Americans, 2010  [email or web address]'s MyPlate            Stacia Barcenas PA-C  4/23/2025    " (1) completely immobile

## 2025-04-23 NOTE — NURSING NOTE
Chief Complaint   Patient presents with    Physical     CPX fasting, no concerns      Pre-visit Screening:  Immunizations:  not up to date - prevnar today   Colonoscopy:  is up to date  Mammogram: is completed today - went this morning to [facility]   Asthma Action Test/Plan:  na  PHQ9:  phq2 given  GAD7:  na  Questioned patient about current smoking habits Pt. has never smoked.  Ok to leave detailed message on voice mail for today's visit only yes, phone # [phone] (home)